# Patient Record
Sex: FEMALE | Race: WHITE | NOT HISPANIC OR LATINO
[De-identification: names, ages, dates, MRNs, and addresses within clinical notes are randomized per-mention and may not be internally consistent; named-entity substitution may affect disease eponyms.]

---
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
## 2018-08-20 ENCOUNTER — TRANSCRIPTION ENCOUNTER (OUTPATIENT)
Age: 18
End: 2018-08-20

## 2019-01-02 ENCOUNTER — OUTPATIENT (OUTPATIENT)
Dept: OUTPATIENT SERVICES | Facility: HOSPITAL | Age: 19
LOS: 1 days | Discharge: HOME | End: 2019-01-02

## 2019-01-02 DIAGNOSIS — E61.1 IRON DEFICIENCY: ICD-10-CM

## 2019-01-02 DIAGNOSIS — Z13.31 ENCOUNTER FOR SCREENING FOR DEPRESSION: ICD-10-CM

## 2019-01-02 DIAGNOSIS — R94.6 ABNORMAL RESULTS OF THYROID FUNCTION STUDIES: ICD-10-CM

## 2019-01-02 DIAGNOSIS — D51.9 VITAMIN B12 DEFICIENCY ANEMIA, UNSPECIFIED: ICD-10-CM

## 2019-01-02 DIAGNOSIS — R53.82 CHRONIC FATIGUE, UNSPECIFIED: ICD-10-CM

## 2019-01-02 DIAGNOSIS — E78.5 HYPERLIPIDEMIA, UNSPECIFIED: ICD-10-CM

## 2019-09-11 ENCOUNTER — EMERGENCY (EMERGENCY)
Facility: HOSPITAL | Age: 19
LOS: 0 days | Discharge: HOME | End: 2019-09-11
Admitting: FAMILY MEDICINE

## 2019-11-23 ENCOUNTER — OUTPATIENT (OUTPATIENT)
Dept: OUTPATIENT SERVICES | Facility: HOSPITAL | Age: 19
LOS: 1 days | Discharge: HOME | End: 2019-11-23
Payer: MEDICAID

## 2019-11-23 DIAGNOSIS — R31.9 HEMATURIA, UNSPECIFIED: ICD-10-CM

## 2019-11-23 PROCEDURE — 76770 US EXAM ABDO BACK WALL COMP: CPT | Mod: 26

## 2019-12-09 ENCOUNTER — TRANSCRIPTION ENCOUNTER (OUTPATIENT)
Age: 19
End: 2019-12-09

## 2019-12-27 ENCOUNTER — OUTPATIENT (OUTPATIENT)
Dept: OUTPATIENT SERVICES | Facility: HOSPITAL | Age: 19
LOS: 1 days | Discharge: HOME | End: 2019-12-27

## 2020-04-22 ENCOUNTER — TRANSCRIPTION ENCOUNTER (OUTPATIENT)
Age: 20
End: 2020-04-22

## 2021-06-04 ENCOUNTER — TRANSCRIPTION ENCOUNTER (OUTPATIENT)
Age: 21
End: 2021-06-04

## 2022-03-21 PROBLEM — Z00.00 ENCOUNTER FOR PREVENTIVE HEALTH EXAMINATION: Status: ACTIVE | Noted: 2022-03-21

## 2022-03-31 ENCOUNTER — TRANSCRIPTION ENCOUNTER (OUTPATIENT)
Age: 22
End: 2022-03-31

## 2022-04-04 ENCOUNTER — TRANSCRIPTION ENCOUNTER (OUTPATIENT)
Age: 22
End: 2022-04-04

## 2022-05-02 ENCOUNTER — APPOINTMENT (OUTPATIENT)
Dept: UROGYNECOLOGY | Facility: CLINIC | Age: 22
End: 2022-05-02

## 2022-07-15 ENCOUNTER — NON-APPOINTMENT (OUTPATIENT)
Age: 22
End: 2022-07-15

## 2022-09-06 ENCOUNTER — NON-APPOINTMENT (OUTPATIENT)
Age: 22
End: 2022-09-06

## 2022-11-12 ENCOUNTER — NON-APPOINTMENT (OUTPATIENT)
Age: 22
End: 2022-11-12

## 2022-11-14 ENCOUNTER — INPATIENT (INPATIENT)
Facility: HOSPITAL | Age: 22
LOS: 0 days | Discharge: HOME | End: 2022-11-15
Attending: HOSPITALIST | Admitting: HOSPITALIST

## 2022-11-14 VITALS
DIASTOLIC BLOOD PRESSURE: 66 MMHG | TEMPERATURE: 101 F | RESPIRATION RATE: 20 BRPM | WEIGHT: 169.98 LBS | OXYGEN SATURATION: 100 % | SYSTOLIC BLOOD PRESSURE: 120 MMHG | HEART RATE: 123 BPM

## 2022-11-14 DIAGNOSIS — Z98.891 HISTORY OF UTERINE SCAR FROM PREVIOUS SURGERY: Chronic | ICD-10-CM

## 2022-11-14 LAB
ALBUMIN SERPL ELPH-MCNC: 4.4 G/DL — SIGNIFICANT CHANGE UP (ref 3.5–5.2)
ALP SERPL-CCNC: 83 U/L — SIGNIFICANT CHANGE UP (ref 30–115)
ALT FLD-CCNC: 8 U/L — SIGNIFICANT CHANGE UP (ref 0–41)
ANION GAP SERPL CALC-SCNC: 12 MMOL/L — SIGNIFICANT CHANGE UP (ref 7–14)
APPEARANCE UR: ABNORMAL
AST SERPL-CCNC: 14 U/L — SIGNIFICANT CHANGE UP (ref 0–41)
BACTERIA # UR AUTO: ABNORMAL
BASOPHILS # BLD AUTO: 0.02 K/UL — SIGNIFICANT CHANGE UP (ref 0–0.2)
BASOPHILS NFR BLD AUTO: 0.1 % — SIGNIFICANT CHANGE UP (ref 0–1)
BILIRUB DIRECT SERPL-MCNC: <0.2 MG/DL — SIGNIFICANT CHANGE UP (ref 0–0.3)
BILIRUB INDIRECT FLD-MCNC: >0.2 MG/DL — SIGNIFICANT CHANGE UP (ref 0.2–1.2)
BILIRUB SERPL-MCNC: 0.4 MG/DL — SIGNIFICANT CHANGE UP (ref 0.2–1.2)
BILIRUB UR-MCNC: NEGATIVE — SIGNIFICANT CHANGE UP
BUN SERPL-MCNC: 6 MG/DL — LOW (ref 10–20)
CALCIUM SERPL-MCNC: 8.8 MG/DL — SIGNIFICANT CHANGE UP (ref 8.4–10.5)
CHLORIDE SERPL-SCNC: 98 MMOL/L — SIGNIFICANT CHANGE UP (ref 98–110)
CO2 SERPL-SCNC: 22 MMOL/L — SIGNIFICANT CHANGE UP (ref 17–32)
COLOR SPEC: YELLOW — SIGNIFICANT CHANGE UP
CREAT SERPL-MCNC: 0.8 MG/DL — SIGNIFICANT CHANGE UP (ref 0.7–1.5)
DIFF PNL FLD: ABNORMAL
EGFR: 105 ML/MIN/1.73M2 — SIGNIFICANT CHANGE UP
EOSINOPHIL # BLD AUTO: 0 K/UL — SIGNIFICANT CHANGE UP (ref 0–0.7)
EOSINOPHIL NFR BLD AUTO: 0 % — SIGNIFICANT CHANGE UP (ref 0–8)
EPI CELLS # UR: 3 /HPF — SIGNIFICANT CHANGE UP (ref 0–5)
GLUCOSE SERPL-MCNC: 103 MG/DL — HIGH (ref 70–99)
GLUCOSE UR QL: NEGATIVE — SIGNIFICANT CHANGE UP
HCT VFR BLD CALC: 39.4 % — SIGNIFICANT CHANGE UP (ref 37–47)
HGB BLD-MCNC: 13.3 G/DL — SIGNIFICANT CHANGE UP (ref 12–16)
HYALINE CASTS # UR AUTO: 2 /LPF — SIGNIFICANT CHANGE UP (ref 0–7)
IMM GRANULOCYTES NFR BLD AUTO: 0.4 % — HIGH (ref 0.1–0.3)
KETONES UR-MCNC: ABNORMAL
LACTATE SERPL-SCNC: 1 MMOL/L — SIGNIFICANT CHANGE UP (ref 0.7–2)
LEUKOCYTE ESTERASE UR-ACNC: ABNORMAL
LIDOCAIN IGE QN: 16 U/L — SIGNIFICANT CHANGE UP (ref 7–60)
LYMPHOCYTES # BLD AUTO: 1.07 K/UL — LOW (ref 1.2–3.4)
LYMPHOCYTES # BLD AUTO: 7.7 % — LOW (ref 20.5–51.1)
MCHC RBC-ENTMCNC: 29 PG — SIGNIFICANT CHANGE UP (ref 27–31)
MCHC RBC-ENTMCNC: 33.8 G/DL — SIGNIFICANT CHANGE UP (ref 32–37)
MCV RBC AUTO: 85.8 FL — SIGNIFICANT CHANGE UP (ref 81–99)
MONOCYTES # BLD AUTO: 1.41 K/UL — HIGH (ref 0.1–0.6)
MONOCYTES NFR BLD AUTO: 10.1 % — HIGH (ref 1.7–9.3)
NEUTROPHILS # BLD AUTO: 11.37 K/UL — HIGH (ref 1.4–6.5)
NEUTROPHILS NFR BLD AUTO: 81.7 % — HIGH (ref 42.2–75.2)
NITRITE UR-MCNC: NEGATIVE — SIGNIFICANT CHANGE UP
NRBC # BLD: 0 /100 WBCS — SIGNIFICANT CHANGE UP (ref 0–0)
PH UR: 6 — SIGNIFICANT CHANGE UP (ref 5–8)
PLATELET # BLD AUTO: 270 K/UL — SIGNIFICANT CHANGE UP (ref 130–400)
POTASSIUM SERPL-MCNC: 4.5 MMOL/L — SIGNIFICANT CHANGE UP (ref 3.5–5)
POTASSIUM SERPL-SCNC: 4.5 MMOL/L — SIGNIFICANT CHANGE UP (ref 3.5–5)
PROT SERPL-MCNC: 7 G/DL — SIGNIFICANT CHANGE UP (ref 6–8)
PROT UR-MCNC: ABNORMAL
RBC # BLD: 4.59 M/UL — SIGNIFICANT CHANGE UP (ref 4.2–5.4)
RBC # FLD: 13 % — SIGNIFICANT CHANGE UP (ref 11.5–14.5)
RBC CASTS # UR COMP ASSIST: 6 /HPF — HIGH (ref 0–4)
SARS-COV-2 RNA SPEC QL NAA+PROBE: SIGNIFICANT CHANGE UP
SODIUM SERPL-SCNC: 132 MMOL/L — LOW (ref 135–146)
SP GR SPEC: 1.01 — SIGNIFICANT CHANGE UP (ref 1.01–1.03)
UROBILINOGEN FLD QL: SIGNIFICANT CHANGE UP
WBC # BLD: 13.93 K/UL — HIGH (ref 4.8–10.8)
WBC # FLD AUTO: 13.93 K/UL — HIGH (ref 4.8–10.8)
WBC UR QL: 648 /HPF — HIGH (ref 0–5)

## 2022-11-14 PROCEDURE — 99223 1ST HOSP IP/OBS HIGH 75: CPT

## 2022-11-14 PROCEDURE — 99285 EMERGENCY DEPT VISIT HI MDM: CPT

## 2022-11-14 PROCEDURE — 74177 CT ABD & PELVIS W/CONTRAST: CPT | Mod: 26,MA

## 2022-11-14 RX ORDER — SENNA PLUS 8.6 MG/1
2 TABLET ORAL AT BEDTIME
Refills: 0 | Status: DISCONTINUED | OUTPATIENT
Start: 2022-11-14 | End: 2022-11-15

## 2022-11-14 RX ORDER — DIATRIZOATE MEGLUMINE 180 MG/ML
30 INJECTION, SOLUTION INTRAVESICAL ONCE
Refills: 0 | Status: COMPLETED | OUTPATIENT
Start: 2022-11-14 | End: 2022-11-14

## 2022-11-14 RX ORDER — ACETAMINOPHEN 500 MG
975 TABLET ORAL ONCE
Refills: 0 | Status: COMPLETED | OUTPATIENT
Start: 2022-11-14 | End: 2022-11-14

## 2022-11-14 RX ORDER — FAMOTIDINE 10 MG/ML
20 INJECTION INTRAVENOUS ONCE
Refills: 0 | Status: COMPLETED | OUTPATIENT
Start: 2022-11-14 | End: 2022-11-14

## 2022-11-14 RX ORDER — SODIUM CHLORIDE 9 MG/ML
1000 INJECTION INTRAMUSCULAR; INTRAVENOUS; SUBCUTANEOUS ONCE
Refills: 0 | Status: COMPLETED | OUTPATIENT
Start: 2022-11-14 | End: 2022-11-14

## 2022-11-14 RX ORDER — ONDANSETRON 8 MG/1
4 TABLET, FILM COATED ORAL ONCE
Refills: 0 | Status: COMPLETED | OUTPATIENT
Start: 2022-11-14 | End: 2022-11-14

## 2022-11-14 RX ORDER — CEFTRIAXONE 500 MG/1
1000 INJECTION, POWDER, FOR SOLUTION INTRAMUSCULAR; INTRAVENOUS ONCE
Refills: 0 | Status: COMPLETED | OUTPATIENT
Start: 2022-11-14 | End: 2022-11-14

## 2022-11-14 RX ORDER — POLYETHYLENE GLYCOL 3350 17 G/17G
17 POWDER, FOR SOLUTION ORAL DAILY
Refills: 0 | Status: DISCONTINUED | OUTPATIENT
Start: 2022-11-14 | End: 2022-11-15

## 2022-11-14 RX ADMIN — SODIUM CHLORIDE 1000 MILLILITER(S): 9 INJECTION INTRAMUSCULAR; INTRAVENOUS; SUBCUTANEOUS at 14:47

## 2022-11-14 RX ADMIN — ONDANSETRON 4 MILLIGRAM(S): 8 TABLET, FILM COATED ORAL at 14:47

## 2022-11-14 RX ADMIN — FAMOTIDINE 20 MILLIGRAM(S): 10 INJECTION INTRAVENOUS at 14:47

## 2022-11-14 RX ADMIN — DIATRIZOATE MEGLUMINE 30 MILLILITER(S): 180 INJECTION, SOLUTION INTRAVESICAL at 15:16

## 2022-11-14 RX ADMIN — Medication 975 MILLIGRAM(S): at 14:47

## 2022-11-14 RX ADMIN — CEFTRIAXONE 100 MILLIGRAM(S): 500 INJECTION, POWDER, FOR SOLUTION INTRAMUSCULAR; INTRAVENOUS at 18:00

## 2022-11-14 NOTE — ED PROVIDER NOTE - ATTENDING APP SHARED VISIT CONTRIBUTION OF CARE
22-year-old female presents to the ED for left lower quadrant abdominal pain.  Also reports fevers chills and dysuria.  Physical exam the left flank as well as left lower quadrant abdominal pain.  Labs reviewed by me noted to have leukocytosis along with a UA that had large leuk esterase.      Given ceftriaxone. Case was signed out to  pending CT abdomen pelvis.

## 2022-11-14 NOTE — ED PROVIDER NOTE - CLINICAL SUMMARY MEDICAL DECISION MAKING FREE TEXT BOX
pt nontoxic appearing during ED period. ed w/u showing ascending UTI, CT c/f developing phlegmon.  iv abx given. will admit for monitoring, parenteral abx, further w/u

## 2022-11-14 NOTE — H&P ADULT - ASSESSMENT
A 22-year-old woman with a pmhx of recurrent UTIs, hypothyroidism, and anxiety came to the ED complaining of dull abdominal pain and constipation for 4 days. Associated with fevers, chills, urinary frequency and suprapubic pain. The patient visited an urgent care a day before presentation and was prescribed a course of antibiotics but did not relief her symptoms so she presented to the ED. She has a hx of recurrent UTIs, latest in June 2021, and one episode of pyelonephritis in 2019 that required hospitalization. She was admitted for workup and treatment of pyelonephritis.       #Pyelonephritis  #Developing renal abscess  #Recurrent UTIs  - Patient has a hx of recurrent UTIs including pyelonephritis in 2019   - Leukocytosis. UA showed +LE, +Leukocytes, + bacteria. FU urine culture and blood culture    - CTAP w oral contrast showed; left renal striated nephrogram (consistent with pyelonephritis), 1.4 left upper pole hypodensity which may represent developing abscess.   - s/p one dose of rocephin  - Rx: Loading dose vancomycin 20 mg/kg IV then continue with 15 mg IV q8h (MRSA coverage)+ meropenem 1 g IV q8h (Gram - bacilli coverage)  - FU ID consult     #Hypothyroidism   - FU TSH   - cw levothyroxine     #Anxiety  - cw bupropion     # constipation  - Senna and miralax     #Misc;  - GI prophylaxis: none  - DVT prophylaxis:  - Diet; regular   - Dispo: medicine floor  A 22-year-old woman with a pmhx of recurrent UTIs, hypothyroidism, and anxiety came to the ED complaining of dull abdominal pain and constipation for 4 days. Associated with fevers, chills, urinary frequency and suprapubic pain. The patient visited an urgent care a day before presentation and was prescribed a course of antibiotics but did not relief her symptoms so she presented to the ED. She has a hx of recurrent UTIs, latest in June 2021, and one episode of pyelonephritis in 2019 that required hospitalization. She was admitted for workup and treatment of pyelonephritis.       #Pyelonephritis  #Developing renal abscess  #Recurrent UTIs  - Patient has a hx of recurrent UTIs including pyelonephritis in 2019   - Leukocytosis. UA showed +LE, +Leukocytes, + bacteria. FU urine culture and blood culture    - CTAP w oral and IV contrast showed; left renal striated nephrogram (consistent with pyelonephritis), 1.4 left upper pole hypodensity which may represent developing abscess.   - s/p one dose of rocephin in the ED. Patient also took antibiotics from urgent care yesterday  - Rx: Loading dose vancomycin 20 mg/kg IV then continue with 15 mg IV q8h (MRSA coverage)+ meropenem 1 g IV q8h (Gram - bacilli coverage)  - FU ID consult     #Hypothyroidism   - FU TSH   - cw levothyroxine     #Anxiety  - cw bupropion     # constipation  - Senna and miralax     #Misc;  - GI prophylaxis: none  - DVT prophylaxis:  - Diet; regular   - Dispo: medicine floor  A 22-year-old woman with a pmhx of recurrent UTIs, hypothyroidism, and anxiety came to the ED complaining of dull abdominal pain and constipation for 4 days. Associated with fevers, chills, urinary frequency and suprapubic pain. The patient visited an urgent care a day before presentation and was prescribed a course of antibiotics but did not relief her symptoms so she presented to the ED. She has a hx of recurrent UTIs, latest in June 2021, and one episode of pyelonephritis in 2019 that required hospitalization. She was admitted for workup and treatment of pyelonephritis.       #Pyelonephritis  #Developing renal abscess  #Recurrent UTIs  - Patient has a hx of recurrent UTIs including pyelonephritis in 2019   - Leukocytosis. UA showed +LE, +Leukocytes, + bacteria. FU urine culture and blood culture    - CTAP w oral and IV contrast showed; left renal striated nephrogram (consistent with pyelonephritis), 1.4 left upper pole hypodensity which may represent developing abscess.   - s/p one dose of rocephin in the ED. Patient also took antibiotics from urgent care yesterday  - Rx: Loading dose vancomycin 20 mg/kg IV then continue with 15 mg IV q8h (MRSA coverage)+ meropenem 1 g IV q12h (Gram - bacilli coverage)  - FU ID consult     #Hypothyroidism   - FU TSH   - cw levothyroxine     #Anxiety  - cw bupropion     # constipation  - Senna and miralax     #Misc;  - GI prophylaxis: none  - DVT prophylaxis:  - Diet; regular   - Dispo: medicine floor  A 22-year-old woman with a pmhx of recurrent UTIs, hypothyroidism, and anxiety came to the ED complaining of dull abdominal pain and constipation for 4 days. Associated with fevers, chills, urinary frequency and suprapubic pain. The patient visited an urgent care a day before presentation and was prescribed a course of antibiotics but did not relief her symptoms so she presented to the ED. She has a hx of recurrent UTIs, latest in June 2021, and one episode of pyelonephritis in 2019 that required hospitalization. She was admitted for workup and treatment of pyelonephritis.       #Pyelonephritis  #Developing renal abscess  #Recurrent UTIs  - Patient has a hx of recurrent UTIs including pyelonephritis in 2019   - Leukocytosis. UA showed +LE, +Leukocytes, + bacteria. FU urine culture and blood culture    - CTAP w oral and IV contrast showed; left renal striated nephrogram (consistent with pyelonephritis), 1.4 left upper pole hypodensity which may represent developing abscess.   - s/p one dose of rocephin in the ED. Patient also took antibiotics from urgent care yesterday  - Rx: Loading dose vancomycin 20 mg/kg IV then continue with 15 mg IV q12h (MRSA coverage)+ meropenem 1 g IV q8h (Gram - bacilli coverage)  - FU ID consult     #Hypothyroidism   - FU TSH   - cw levothyroxine     #Anxiety  - cw bupropion     # constipation  - Senna and miralax     #Misc;  - GI prophylaxis: none  - DVT prophylaxis:  - Diet; regular   - Dispo: medicine floor

## 2022-11-14 NOTE — H&P ADULT - ATTENDING COMMENTS
Patient seen and examined at bedside independently of the residents. I read the resident's note and agree with the plan with the additions and corrections as noted below.    REVIEW OF SYSTEMS:  CONSTITUTIONAL: No weakness. Fever/Chills.   EYES/ENT: No visual changes;  No vertigo or throat pain   NECK: No pain or stiffness  RESPIRATORY: No cough, wheezing, hemoptysis; No shortness of breath  CARDIOVASCULAR: No chest pain or palpitations  GASTROINTESTINAL: No abdominal or epigastric pain. No nausea, vomiting, or hematemesis; No diarrhea or constipation. No melena or hematochezia.  GENITOURINARY: No  frequency or hematuria. Dysuria.   NEUROLOGICAL: No numbness or weakness  MSK: No pain. No weakness.   SKIN: No itching, rashes.     PMH: Recurrent UTI, Hypothyroidism and Anxiety     FHx: Reviewed. Not relevant.     Physical Exam:  GEN: No acute distress. A & O x 3.   Head: Atraumatic, Normocephalic.   Eye: PEERLA. No sclera icterus. EOMI.   ENT: Normal oropharynx, no thyromegaly.   LUNGS: Clear to auscultation bilaterally. No wheeze/rales/crackles.   HEART: Normal. S1/S2 present. RRR. No murmur/gallops.   ABD: Soft, non-tender, non-distended. Bowel sounds present.   EXT: No pitting edema.   Integumentary: No rash, No petechia.   NEURO: CN III-XII intact. Strength: 5/5 b/l ULE. Sensory intact b/l ULE.     Vital Signs Last 24 Hrs  T(C): 37.6 (2022 22:00), Max: 38.2 (2022 13:38)  T(F): 99.6 (2022 22:00), Max: 100.8 (2022 13:38)  HR: 96 (2022 22:00) (96 - 123)  BP: 115/70 (2022 22:00) (102/65 - 120/66)  BP(mean): --  RR: 16 (2022 22:00) (16 - 20)  SpO2: 97% (2022 22:00) (97% - 100%)    Parameters below as of :00  Patient On (Oxygen Delivery Method): room air      Please see the above notes for Labs and radiology.     Assessment and Plan:     23 yo F with hx of Recurrent UTI, Hypothyroidism and Anxiety presents to ED for 4 days history of fever, chills, dysuria and lower abdominal pain.     Sepsis likely 2/2 pyelonephritis with abscess   CT abdomen shows Left renal striated nephrogram consistent with pyelonephritis. 1.4 cm left upper pole hypodensity may represent a developing abscess.  - will start on Vancomycin and meropenem   - check BCx and UCx  - ID consult     Hypothyroidism - check TSH. c/w home med  Anxiety - c/w home med  Constipation - senna and miralax     DVT ppx: Lovenox SC  GI ppx: not indicated.   Diet: regular diet  Activity: as tolerated.     Date seen by the attendin2022. Patient seen and examined at bedside independently of the residents. I read the resident's note and agree with the plan with the additions and corrections as noted below.    REVIEW OF SYSTEMS:  CONSTITUTIONAL: No weakness. Fever/Chills.   EYES/ENT: No visual changes;  No vertigo or throat pain   NECK: No pain or stiffness  RESPIRATORY: No cough, wheezing, hemoptysis; No shortness of breath  CARDIOVASCULAR: No chest pain or palpitations  GASTROINTESTINAL: No nausea, vomiting, or hematemesis; No diarrhea or constipation. No melena or hematochezia. Abdominal pain.   GENITOURINARY: No  frequency or hematuria. Dysuria.   NEUROLOGICAL: No numbness or weakness  MSK: No pain. No weakness.   SKIN: No itching, rashes.     PMH: Recurrent UTI, Hypothyroidism and Anxiety     FHx: Reviewed. Not relevant.     Physical Exam:  GEN: No acute distress. A & O x 3.   Head: Atraumatic, Normocephalic.   Eye: PEERLA. No sclera icterus. EOMI.   ENT: Normal oropharynx, no thyromegaly.   LUNGS: Clear to auscultation bilaterally. No wheeze/rales/crackles.   HEART: Normal. S1/S2 present. RRR. No murmur/gallops.   ABD: Soft, non-tender, non-distended. Bowel sounds present.   EXT: No pitting edema.   Integumentary: No rash, No petechia.   NEURO: CN III-XII intact. Strength: 5/5 b/l ULE. Sensory intact b/l ULE.     Vital Signs Last 24 Hrs  T(C): 37.6 (2022 22:00), Max: 38.2 (2022 13:38)  T(F): 99.6 (2022 22:00), Max: 100.8 (2022 13:38)  HR: 96 (2022 22:00) (96 - 123)  BP: 115/70 (2022 22:00) (102/65 - 120/66)  BP(mean): --  RR: 16 (2022 22:00) (16 - 20)  SpO2: 97% (2022 22:00) (97% - 100%)    Parameters below as of 2022 22:00  Patient On (Oxygen Delivery Method): room air      Please see the above notes for Labs and radiology.     Assessment and Plan:     23 yo F with hx of Recurrent UTI, Hypothyroidism and Anxiety presents to ED for 4 days history of fever, chills, dysuria and lower abdominal pain.     Sepsis likely 2/2 pyelonephritis with abscess   - CT abdomen shows Left renal striated nephrogram consistent with pyelonephritis. 1.4 cm left upper pole hypodensity may represent a developing abscess.  - will start on Vancomycin and meropenem (pt had recurrent UTI in the past treated with multiple abx prescribed by urgent care)  - check BCx and UCx  - ID consult     Hypothyroidism - check TSH. c/w home med  Anxiety - c/w home med  Constipation - senna and miralax     DVT ppx: Lovenox SC  GI ppx: not indicated.   Diet: regular diet  Activity: as tolerated.     Date seen by the attendin2022.

## 2022-11-14 NOTE — ED ADULT TRIAGE NOTE - CHIEF COMPLAINT QUOTE
patient reports constipation x 5 days. patient took enema and stool softener without relief. abdomen soft tender to right side. patient also noted to have fever in triage

## 2022-11-14 NOTE — ED PROVIDER NOTE - PHYSICAL EXAMINATION
Vital Signs: I have reviewed the initial vital signs.  Constitutional: NAD, well-nourished, appears stated age, no acute distress.  HEENT: Airway patent, moist MM, no erythema/swelling/deformity of oral structures. EOMI, PERRLA.  CV: regular rate, regular rhythm, well-perfused extremities, 2+ b/l DP and radial pulses equal.  Lungs: BCTA, no increased WOB.  ABD: +tenderness lower abdomen, ND, no guarding or rebound, no pulsatile mass, no hernias.   MSK: Neck supple, nontender, nl ROM, no stepoff. Chest nontender. Back nontender. No CVA tenderness. Ext nontender, nl rom, no deformity.   INTEG: Skin warm, dry, no rash.  NEURO: A&Ox3, normal strength, nl sensation throughout, normal speech.   PSYCH: Calm, cooperative, normal affect and interaction.

## 2022-11-14 NOTE — H&P ADULT - HISTORY OF PRESENT ILLNESS
A 22-year-old female with pmhx of recurrent UTIs, hypothyroidism, anxiety, presented to the ED today ( 11/14/22) complaining of abdominal pain and constipation for the past 4 days. According to the patient, the pain is dull, located in the left side of the abdomen, moderate in intensity, continuous without any radiation, no exacerbating or relieving factors. Additionally, she has been having fevers (not measured at home), chills, and a mild suprapubic pain. She also had urinary frequency. Denies any changes in urine color, no dysuria or burning sensation on urination. The patient went to the urgent care yesterday, and was prescribed a one week course of antibiotics but did not feel that it helped so she came to the ED.     She has a history of recurrent UTIs, latest one was in June 2021 which she took antibiotics for. According to the patient, she approximately had around 8 UTIs in the past 3 years. Additionally, she had a kidney infection when she was in [place] (she used to live there) in 2019 that required hospitalization and IV antibiotics.

## 2022-11-14 NOTE — ED PROVIDER NOTE - NS ED ROS FT
Constitutional: (+) fever, (+)chills  Eyes/ENT: (-) blurry vision, (-) epistaxis  Cardiovascular: (-) chest pain, (-) syncope  Respiratory: (-) cough, (-) shortness of breath  Gastrointestinal: (-) vomiting, (-) diarrhea, (+)constipation, (+)abd pain  : (+)dysuria  Musculoskeletal: (-) neck pain, (-) back pain, (-) joint pain  Integumentary: (-) rash, (-) edema  Neurological: (-) headache, (-) altered mental status  Psychiatric: (-) hallucinations

## 2022-11-14 NOTE — ED PROVIDER NOTE - CRITERIA ADMIT PEDS PT
10/19/2020         RE: Shena Hartmann  [address] [place] [address]        Dear Colleague,    Thank you for referring your patient, Shena Hartmann, to the [facility] [place]. Please see a copy of my visit note below.    Infusion Nursing Note:  Shena Hartmann presents today for scheduled Cycle 2, Day 22 Daratumumab Fastpro.    Patient seen by provider today: No   present during visit today: Not Applicable.    Note: Labs were monitored and lab parameters for today's treatment were met.  Patient assessment was completed and unremarkable except:  Patient has a history of tingling in bilateral feet and hands and mild tingling in her tongue - which she states is at her baseline.  Patient has chronic generalized body aches, which is at her baseline this morning.      Treatment Conditions:  Patient received 650 mg oral Tylenol and 25 mg oral Benadryl prior to receiving scheduled Daratumumab Fastpro injection in her lower right abdomen.      Note:  Patient does not take Decadron pre-med and Dr. Hills has approved this.      Post Infusion Assessment:  Patient tolerated injection without incident.       Discharge Plan:   Patient was ambulatory and discharged in the care of her .    TAMMY REYES, MELISSA                            Again, thank you for allowing me to participate in the care of your patient.        Sincerely,        [facility]     No

## 2022-11-14 NOTE — H&P ADULT - NSHPLABSRESULTS_GEN_ALL_CORE
.  LABS:                         13.3   13.93 )-----------( 270      ( 2022 14:43 )             39.4         132<L>  |  98  |  6<L>  ----------------------------<  103<H>  4.5   |  22  |  0.8    Ca    8.8      2022 14:43    TPro  7.0  /  Alb  4.4  /  TBili  0.4  /  DBili  <0.2  /  AST  14  /  ALT  8   /  AlkPhos  83        Urinalysis Basic - ( 2022 14:43 )    Color: Yellow / Appearance: Slightly Turbid / S.014 / pH: x  Gluc: x / Ketone: Moderate  / Bili: Negative / Urobili: <2 mg/dL   Blood: x / Protein: 30 mg/dL / Nitrite: Negative   Leuk Esterase: Large / RBC: 6 /HPF /  /HPF   Sq Epi: x / Non Sq Epi: 3 /HPF / Bacteria: Moderate        Lactate, Blood: 1.0 mmol/L ( @ 14:43)      RADIOLOGY, EKG & ADDITIONAL TESTS: Reviewed.

## 2022-11-14 NOTE — H&P ADULT - NSHPPHYSICALEXAM_GEN_ALL_CORE
VITALS:   T(C): 37.6 (11-14-22 @ 22:00), Max: 38.2 (11-14-22 @ 13:38)  HR: 96 (11-14-22 @ 22:00) (96 - 123)  BP: 115/70 (11-14-22 @ 22:00) (102/65 - 120/66)  RR: 16 (11-14-22 @ 22:00) (16 - 20)  SpO2: 97% (11-14-22 @ 22:00) (97% - 100%)    GENERAL: NAD, sitting in bed comfortably   HEAD:  Atraumatic, normocephalic  EYES: EOMI, PERRLA, conjunctiva and sclera clear  ENT: Moist mucous membranes  NECK: Supple, no JVD  HEART: Regular rate and rhythm, no murmurs, rubs, or gallops  LUNGS: Unlabored respirations.  Clear to auscultation bilaterally, no crackles, wheezing, or rhonchi  ABDOMEN: Soft,  nondistended, +BS. Left costovertebral angle tenderness     EXTREMITIES: 2+ peripheral pulses bilaterally. No clubbing, cyanosis, or edema  NERVOUS SYSTEM:  A&Ox3, no focal deficits   SKIN: No rashes or lesions

## 2022-11-15 ENCOUNTER — TRANSCRIPTION ENCOUNTER (OUTPATIENT)
Age: 22
End: 2022-11-15

## 2022-11-15 ENCOUNTER — INPATIENT (INPATIENT)
Facility: HOSPITAL | Age: 22
LOS: 1 days | Discharge: HOME HEALTH | End: 2022-11-17
Attending: STUDENT IN AN ORGANIZED HEALTH CARE EDUCATION/TRAINING PROGRAM | Admitting: STUDENT IN AN ORGANIZED HEALTH CARE EDUCATION/TRAINING PROGRAM

## 2022-11-15 VITALS
RESPIRATION RATE: 18 BRPM | WEIGHT: 169.98 LBS | OXYGEN SATURATION: 96 % | TEMPERATURE: 97 F | DIASTOLIC BLOOD PRESSURE: 69 MMHG | SYSTOLIC BLOOD PRESSURE: 118 MMHG | HEIGHT: 65 IN | HEART RATE: 103 BPM

## 2022-11-15 VITALS
HEART RATE: 87 BPM | DIASTOLIC BLOOD PRESSURE: 58 MMHG | SYSTOLIC BLOOD PRESSURE: 107 MMHG | TEMPERATURE: 99 F | RESPIRATION RATE: 18 BRPM

## 2022-11-15 DIAGNOSIS — Z98.891 HISTORY OF UTERINE SCAR FROM PREVIOUS SURGERY: Chronic | ICD-10-CM

## 2022-11-15 LAB
A1C WITH ESTIMATED AVERAGE GLUCOSE RESULT: 5.3 % — SIGNIFICANT CHANGE UP (ref 4–5.6)
ALBUMIN SERPL ELPH-MCNC: 3.8 G/DL — SIGNIFICANT CHANGE UP (ref 3.5–5.2)
ALBUMIN SERPL ELPH-MCNC: 4 G/DL — SIGNIFICANT CHANGE UP (ref 3.5–5.2)
ALP SERPL-CCNC: 81 U/L — SIGNIFICANT CHANGE UP (ref 30–115)
ALP SERPL-CCNC: 81 U/L — SIGNIFICANT CHANGE UP (ref 30–115)
ALT FLD-CCNC: 10 U/L — SIGNIFICANT CHANGE UP (ref 0–41)
ALT FLD-CCNC: 17 U/L — SIGNIFICANT CHANGE UP (ref 0–41)
ANION GAP SERPL CALC-SCNC: 10 MMOL/L — SIGNIFICANT CHANGE UP (ref 7–14)
ANION GAP SERPL CALC-SCNC: 13 MMOL/L — SIGNIFICANT CHANGE UP (ref 7–14)
APTT BLD: 28.9 SEC — SIGNIFICANT CHANGE UP (ref 27–39.2)
AST SERPL-CCNC: 14 U/L — SIGNIFICANT CHANGE UP (ref 0–41)
AST SERPL-CCNC: 23 U/L — SIGNIFICANT CHANGE UP (ref 0–41)
BASOPHILS # BLD AUTO: 0.03 K/UL — SIGNIFICANT CHANGE UP (ref 0–0.2)
BASOPHILS # BLD AUTO: 0.04 K/UL — SIGNIFICANT CHANGE UP (ref 0–0.2)
BASOPHILS NFR BLD AUTO: 0.3 % — SIGNIFICANT CHANGE UP (ref 0–1)
BASOPHILS NFR BLD AUTO: 0.3 % — SIGNIFICANT CHANGE UP (ref 0–1)
BILIRUB SERPL-MCNC: 0.2 MG/DL — SIGNIFICANT CHANGE UP (ref 0.2–1.2)
BILIRUB SERPL-MCNC: <0.2 MG/DL — SIGNIFICANT CHANGE UP (ref 0.2–1.2)
BUN SERPL-MCNC: 6 MG/DL — LOW (ref 10–20)
BUN SERPL-MCNC: 8 MG/DL — LOW (ref 10–20)
CALCIUM SERPL-MCNC: 8.7 MG/DL — SIGNIFICANT CHANGE UP (ref 8.4–10.4)
CALCIUM SERPL-MCNC: 8.7 MG/DL — SIGNIFICANT CHANGE UP (ref 8.4–10.5)
CHLORIDE SERPL-SCNC: 102 MMOL/L — SIGNIFICANT CHANGE UP (ref 98–110)
CHLORIDE SERPL-SCNC: 102 MMOL/L — SIGNIFICANT CHANGE UP (ref 98–110)
CO2 SERPL-SCNC: 24 MMOL/L — SIGNIFICANT CHANGE UP (ref 17–32)
CO2 SERPL-SCNC: 25 MMOL/L — SIGNIFICANT CHANGE UP (ref 17–32)
CREAT SERPL-MCNC: 0.7 MG/DL — SIGNIFICANT CHANGE UP (ref 0.7–1.5)
CREAT SERPL-MCNC: 0.7 MG/DL — SIGNIFICANT CHANGE UP (ref 0.7–1.5)
EGFR: 124 ML/MIN/1.73M2 — SIGNIFICANT CHANGE UP
EGFR: 124 ML/MIN/1.73M2 — SIGNIFICANT CHANGE UP
EOSINOPHIL # BLD AUTO: 0.01 K/UL — SIGNIFICANT CHANGE UP (ref 0–0.7)
EOSINOPHIL # BLD AUTO: 0.02 K/UL — SIGNIFICANT CHANGE UP (ref 0–0.7)
EOSINOPHIL NFR BLD AUTO: 0.1 % — SIGNIFICANT CHANGE UP (ref 0–8)
EOSINOPHIL NFR BLD AUTO: 0.2 % — SIGNIFICANT CHANGE UP (ref 0–8)
ESTIMATED AVERAGE GLUCOSE: 105 MG/DL — SIGNIFICANT CHANGE UP (ref 68–114)
GLUCOSE SERPL-MCNC: 111 MG/DL — HIGH (ref 70–99)
GLUCOSE SERPL-MCNC: 129 MG/DL — HIGH (ref 70–99)
HCT VFR BLD CALC: 36.8 % — LOW (ref 37–47)
HCT VFR BLD CALC: 37.4 % — SIGNIFICANT CHANGE UP (ref 37–47)
HGB BLD-MCNC: 12.3 G/DL — SIGNIFICANT CHANGE UP (ref 12–16)
HGB BLD-MCNC: 12.5 G/DL — SIGNIFICANT CHANGE UP (ref 12–16)
IMM GRANULOCYTES NFR BLD AUTO: 0.4 % — HIGH (ref 0.1–0.3)
IMM GRANULOCYTES NFR BLD AUTO: 0.4 % — HIGH (ref 0.1–0.3)
INR BLD: 1.06 RATIO — SIGNIFICANT CHANGE UP (ref 0.65–1.3)
LACTATE SERPL-SCNC: 1.4 MMOL/L — SIGNIFICANT CHANGE UP (ref 0.7–2)
LYMPHOCYTES # BLD AUTO: 1.53 K/UL — SIGNIFICANT CHANGE UP (ref 1.2–3.4)
LYMPHOCYTES # BLD AUTO: 1.54 K/UL — SIGNIFICANT CHANGE UP (ref 1.2–3.4)
LYMPHOCYTES # BLD AUTO: 13.1 % — LOW (ref 20.5–51.1)
LYMPHOCYTES # BLD AUTO: 13.2 % — LOW (ref 20.5–51.1)
MCHC RBC-ENTMCNC: 28.9 PG — SIGNIFICANT CHANGE UP (ref 27–31)
MCHC RBC-ENTMCNC: 29 PG — SIGNIFICANT CHANGE UP (ref 27–31)
MCHC RBC-ENTMCNC: 33.4 G/DL — SIGNIFICANT CHANGE UP (ref 32–37)
MCHC RBC-ENTMCNC: 33.4 G/DL — SIGNIFICANT CHANGE UP (ref 32–37)
MCV RBC AUTO: 86.4 FL — SIGNIFICANT CHANGE UP (ref 81–99)
MCV RBC AUTO: 86.8 FL — SIGNIFICANT CHANGE UP (ref 81–99)
MONOCYTES # BLD AUTO: 1.1 K/UL — HIGH (ref 0.1–0.6)
MONOCYTES # BLD AUTO: 1.28 K/UL — HIGH (ref 0.1–0.6)
MONOCYTES NFR BLD AUTO: 11 % — HIGH (ref 1.7–9.3)
MONOCYTES NFR BLD AUTO: 9.4 % — HIGH (ref 1.7–9.3)
NEUTROPHILS # BLD AUTO: 8.75 K/UL — HIGH (ref 1.4–6.5)
NEUTROPHILS # BLD AUTO: 8.97 K/UL — HIGH (ref 1.4–6.5)
NEUTROPHILS NFR BLD AUTO: 75 % — SIGNIFICANT CHANGE UP (ref 42.2–75.2)
NEUTROPHILS NFR BLD AUTO: 76.6 % — HIGH (ref 42.2–75.2)
NRBC # BLD: 0 /100 WBCS — SIGNIFICANT CHANGE UP (ref 0–0)
NRBC # BLD: 0 /100 WBCS — SIGNIFICANT CHANGE UP (ref 0–0)
PLATELET # BLD AUTO: 267 K/UL — SIGNIFICANT CHANGE UP (ref 130–400)
PLATELET # BLD AUTO: 281 K/UL — SIGNIFICANT CHANGE UP (ref 130–400)
POTASSIUM SERPL-MCNC: 3.7 MMOL/L — SIGNIFICANT CHANGE UP (ref 3.5–5)
POTASSIUM SERPL-MCNC: 4.2 MMOL/L — SIGNIFICANT CHANGE UP (ref 3.5–5)
POTASSIUM SERPL-SCNC: 3.7 MMOL/L — SIGNIFICANT CHANGE UP (ref 3.5–5)
POTASSIUM SERPL-SCNC: 4.2 MMOL/L — SIGNIFICANT CHANGE UP (ref 3.5–5)
PROT SERPL-MCNC: 6.5 G/DL — SIGNIFICANT CHANGE UP (ref 6–8)
PROT SERPL-MCNC: 6.6 G/DL — SIGNIFICANT CHANGE UP (ref 6–8)
PROTHROM AB SERPL-ACNC: 12.1 SEC — SIGNIFICANT CHANGE UP (ref 9.95–12.87)
RBC # BLD: 4.24 M/UL — SIGNIFICANT CHANGE UP (ref 4.2–5.4)
RBC # BLD: 4.33 M/UL — SIGNIFICANT CHANGE UP (ref 4.2–5.4)
RBC # FLD: 13 % — SIGNIFICANT CHANGE UP (ref 11.5–14.5)
RBC # FLD: 13.2 % — SIGNIFICANT CHANGE UP (ref 11.5–14.5)
SODIUM SERPL-SCNC: 137 MMOL/L — SIGNIFICANT CHANGE UP (ref 135–146)
SODIUM SERPL-SCNC: 139 MMOL/L — SIGNIFICANT CHANGE UP (ref 135–146)
TROPONIN T SERPL-MCNC: <0.01 NG/ML — SIGNIFICANT CHANGE UP
TSH SERPL-MCNC: 1.29 UIU/ML — SIGNIFICANT CHANGE UP (ref 0.27–4.2)
WBC # BLD: 11.66 K/UL — HIGH (ref 4.8–10.8)
WBC # BLD: 11.71 K/UL — HIGH (ref 4.8–10.8)
WBC # FLD AUTO: 11.66 K/UL — HIGH (ref 4.8–10.8)
WBC # FLD AUTO: 11.71 K/UL — HIGH (ref 4.8–10.8)

## 2022-11-15 PROCEDURE — 93010 ELECTROCARDIOGRAM REPORT: CPT

## 2022-11-15 PROCEDURE — 99239 HOSP IP/OBS DSCHRG MGMT >30: CPT

## 2022-11-15 PROCEDURE — 99285 EMERGENCY DEPT VISIT HI MDM: CPT

## 2022-11-15 PROCEDURE — 99223 1ST HOSP IP/OBS HIGH 75: CPT | Mod: 25

## 2022-11-15 RX ORDER — ENOXAPARIN SODIUM 100 MG/ML
40 INJECTION SUBCUTANEOUS EVERY 24 HOURS
Refills: 0 | Status: DISCONTINUED | OUTPATIENT
Start: 2022-11-15 | End: 2022-11-15

## 2022-11-15 RX ORDER — INFLUENZA VIRUS VACCINE 15; 15; 15; 15 UG/.5ML; UG/.5ML; UG/.5ML; UG/.5ML
0.5 SUSPENSION INTRAMUSCULAR ONCE
Refills: 0 | Status: DISCONTINUED | OUTPATIENT
Start: 2022-11-15 | End: 2022-11-15

## 2022-11-15 RX ORDER — LEVOTHYROXINE SODIUM 125 MCG
25 TABLET ORAL DAILY
Refills: 0 | Status: DISCONTINUED | OUTPATIENT
Start: 2022-11-15 | End: 2022-11-17

## 2022-11-15 RX ORDER — CEFTRIAXONE 500 MG/1
2000 INJECTION, POWDER, FOR SOLUTION INTRAMUSCULAR; INTRAVENOUS EVERY 24 HOURS
Refills: 0 | Status: DISCONTINUED | OUTPATIENT
Start: 2022-11-16 | End: 2022-11-17

## 2022-11-15 RX ORDER — ONDANSETRON 8 MG/1
4 TABLET, FILM COATED ORAL EVERY 8 HOURS
Refills: 0 | Status: DISCONTINUED | OUTPATIENT
Start: 2022-11-15 | End: 2022-11-17

## 2022-11-15 RX ORDER — VANCOMYCIN HCL 1 G
VIAL (EA) INTRAVENOUS
Refills: 0 | Status: DISCONTINUED | OUTPATIENT
Start: 2022-11-15 | End: 2022-11-15

## 2022-11-15 RX ORDER — SODIUM CHLORIDE 9 MG/ML
1000 INJECTION, SOLUTION INTRAVENOUS ONCE
Refills: 0 | Status: COMPLETED | OUTPATIENT
Start: 2022-11-15 | End: 2022-11-15

## 2022-11-15 RX ORDER — LANOLIN ALCOHOL/MO/W.PET/CERES
3 CREAM (GRAM) TOPICAL AT BEDTIME
Refills: 0 | Status: DISCONTINUED | OUTPATIENT
Start: 2022-11-15 | End: 2022-11-17

## 2022-11-15 RX ORDER — MEROPENEM 1 G/30ML
1000 INJECTION INTRAVENOUS EVERY 8 HOURS
Refills: 0 | Status: DISCONTINUED | OUTPATIENT
Start: 2022-11-15 | End: 2022-11-15

## 2022-11-15 RX ORDER — CEFTRIAXONE 500 MG/1
2000 INJECTION, POWDER, FOR SOLUTION INTRAMUSCULAR; INTRAVENOUS EVERY 24 HOURS
Refills: 0 | Status: DISCONTINUED | OUTPATIENT
Start: 2022-11-15 | End: 2022-11-15

## 2022-11-15 RX ORDER — ACETAMINOPHEN 500 MG
650 TABLET ORAL EVERY 6 HOURS
Refills: 0 | Status: DISCONTINUED | OUTPATIENT
Start: 2022-11-15 | End: 2022-11-15

## 2022-11-15 RX ORDER — BUPROPION HYDROCHLORIDE 150 MG/1
150 TABLET, EXTENDED RELEASE ORAL DAILY
Refills: 0 | Status: DISCONTINUED | OUTPATIENT
Start: 2022-11-15 | End: 2022-11-17

## 2022-11-15 RX ORDER — CEFTRIAXONE 500 MG/1
2000 INJECTION, POWDER, FOR SOLUTION INTRAMUSCULAR; INTRAVENOUS ONCE
Refills: 0 | Status: COMPLETED | OUTPATIENT
Start: 2022-11-15 | End: 2022-11-15

## 2022-11-15 RX ORDER — SENNA PLUS 8.6 MG/1
2 TABLET ORAL AT BEDTIME
Refills: 0 | Status: DISCONTINUED | OUTPATIENT
Start: 2022-11-15 | End: 2022-11-15

## 2022-11-15 RX ORDER — POLYETHYLENE GLYCOL 3350 17 G/17G
17 POWDER, FOR SOLUTION ORAL DAILY
Refills: 0 | Status: DISCONTINUED | OUTPATIENT
Start: 2022-11-15 | End: 2022-11-15

## 2022-11-15 RX ORDER — VANCOMYCIN HCL 1 G
1250 VIAL (EA) INTRAVENOUS EVERY 12 HOURS
Refills: 0 | Status: DISCONTINUED | OUTPATIENT
Start: 2022-11-15 | End: 2022-11-15

## 2022-11-15 RX ORDER — ACETAMINOPHEN 500 MG
650 TABLET ORAL EVERY 6 HOURS
Refills: 0 | Status: DISCONTINUED | OUTPATIENT
Start: 2022-11-15 | End: 2022-11-17

## 2022-11-15 RX ORDER — VANCOMYCIN HCL 1 G
1500 VIAL (EA) INTRAVENOUS ONCE
Refills: 0 | Status: COMPLETED | OUTPATIENT
Start: 2022-11-15 | End: 2022-11-15

## 2022-11-15 RX ADMIN — Medication 650 MILLIGRAM(S): at 04:58

## 2022-11-15 RX ADMIN — Medication 300 MILLIGRAM(S): at 04:11

## 2022-11-15 RX ADMIN — MEROPENEM 100 MILLIGRAM(S): 1 INJECTION INTRAVENOUS at 07:00

## 2022-11-15 RX ADMIN — Medication 650 MILLIGRAM(S): at 05:46

## 2022-11-15 RX ADMIN — CEFTRIAXONE 100 MILLIGRAM(S): 500 INJECTION, POWDER, FOR SOLUTION INTRAMUSCULAR; INTRAVENOUS at 20:00

## 2022-11-15 RX ADMIN — SODIUM CHLORIDE 1000 MILLILITER(S): 9 INJECTION, SOLUTION INTRAVENOUS at 18:34

## 2022-11-15 RX ADMIN — SODIUM CHLORIDE 1000 MILLILITER(S): 9 INJECTION, SOLUTION INTRAVENOUS at 19:27

## 2022-11-15 NOTE — DISCHARGE NOTE PROVIDER - HOSPITAL COURSE
A 22-year-old female with pmhx of recurrent UTIs, hypothyroidism, anxiety, presented to the ED today ( 11/14/22) complaining of abdominal pain and constipation for the past 4 days. According to the patient, the pain is dull, located in the left side of the abdomen, moderate in intensity, continuous without any radiation, no exacerbating or relieving factors. Additionally, she has been having fevers (not measured at home), chills, and a mild suprapubic pain. She also had urinary frequency. Denies any changes in urine color, no dysuria or burning sensation on urination. The patient went to the urgent care yesterday, and was prescribed a one week course of antibiotics but did not feel that it helped so she came to the ED.     She has a history of recurrent UTIs, latest one was in June 2021 which she took antibiotics for. According to the patient, she approximately had around 8 UTIs in the past 3 years. Additionally, she had a kidney infection when she was in [place] (she used to live there) in 2019 that required hospitalization and IV antibiotics.       T(C): 37.6, Max: 38.2   HR: 96  (96 - 123)  BP: 115/70   RR: 16   SpO2: 97%                  13.3   13.93 )-----------( 270      ( 14 Nov 2022 14:43 )             39.4     132<L>  |  98  |  6<L>  ----------------------------<  103<H>  4.5   |  22  |  0.8    Ca    8.8      14 Nov 2022 14:43  TPro  7.0  /  Alb  4.4  /  TBili  0.4  /  DBili  <0.2  /  AST  14  /  ALT  8   /  AlkPhos  83  11-14  UA showed +LE, +Leukocytes, + bacteria. FU urine culture and blood culture    CTAP w oral and IV contrast showed; left renal striated nephrogram (consistent with pyelonephritis), 1.4 left upper pole hypodensity which may represent developing abscess.  Loading dose vancomycin 20 mg/kg IV then continue with 15 mg IV q12h (MRSA coverage)+ meropenem 1 g IV q8h (Gram - bacilli coverage)     Patient admitted to Medicine for management of Pyelo. ID saw patient, recommended Rocephin 2g. Patient symptoms improved. Patient will be dc'd with Cipro 500 BID for 7 days. Given patients history of reccurent UTI's, needs OP f/u with Urology.     Patient seen and examined, stable for dc     A 22-year-old woman with a pmhx of recurrent UTIs, hypothyroidism, and anxiety came to the ED complaining of dull abdominal pain and constipation for 4 days. She was admitted for workup and treatment of pyelonephritis w suspected abscess.     #Pyelonephritis  #Developing renal abscess  #Recurrent UTIs  - Patient has a hx of recurrent UTIs including pyelonephritis in 2019   - Leukocytosis. UA showed +LE, +Leukocytes, + bacteria. FU urine culture and blood culture    - CTAP w oral and IV contrast showed; left renal striated nephrogram (consistent with pyelonephritis), 1.4 left upper pole hypodensity which may represent developing abscess.   - s/p one dose of rocephin in the ED. Patient also took antibiotics from urgent care yesterday  - Initially, Loading dose vancomycin 20 mg/kg IV then continue with 15 mg IV q12h (MRSA coverage)+ meropenem 1 g IV q8h (Gram - bacilli coverage)  - 11/15 - symptoms improving per the patient   Plan   - ID consult  - Will switch to Rocephin 2g   - dc w/ Cipro BID for 7 days     #Hypothyroidism   - c/w levothyroxine   Plan   - TSH pending     #Anxiety  - c/w bupropion     # constipation  - Senna and miralax     #Misc;  - GI prophylaxis: none  - DVT prophylaxis:  - Diet; regular   - Dispo: dc         A 22-year-old female with pmhx of recurrent UTIs, hypothyroidism, anxiety, presented to the ED complaining of abdominal pain and constipation for the past 4 days, located in the left side of the abdomen, moderate in intensity, continuous without any radiation, she has been having fevers, chills, and a mild suprapubic pain. She also had urinary frequency. She has a history of recurrent UTIs, latest one was in June 2021 which she took antibiotics for. According to the patient, she approximately had around 8 UTIs in the past 3 years. Additionally, she had a kidney infection when she was in [place] (she used to live there) in 2019 that required hospitalization and IV antibiotics. In the ED vital signs were stable, labs showed WBC 13K, UA showed +LE, +Leukocytes, + bacteria. Abdomen CT showed left renal striated nephrogram (consistent with pyelonephritis), 1.4 left upper pole hypodensity which may represent developing abscess. She received IV antibiotics, Patient admitted to Medicine for management of Pyelo. ID saw patient, recommended Rocephin 2g. Patient symptoms improved. Patient will be dc'd with Cipro 500 BID for 7 days. Given patients history of reccurent UTI's, needs OP f/u with Urology.     A/P:   Acute Left Pyelonephritis  #Developing renal abscess  #Recurrent UTIs  - Patient has a hx of recurrent UTIs including pyelonephritis in 2019   - Leukocytosis. UA showed +LE, +Leukocytes, + bacteria. FU urine culture and blood culture    - CTAP w oral and IV contrast showed; left renal striated nephrogram (consistent with pyelonephritis), 1.4 left upper pole hypodensity which may represent developing abscess.   - s/p one dose of rocephin in the ED. Patient also took antibiotics from urgent care yesterday  - Initially, Loading dose vancomycin 20 mg/kg IV then continue with 15 mg IV q12h (MRSA coverage)+ meropenem 1 g IV q8h (Gram - bacilli coverage)  - 11/15 - symptoms improving per the patient   Plan   - ID consult  - Will switch to Rocephin 2g   - dc w/ Cipro BID for 7 days     #Hypothyroidism   - c/w levothyroxine   Plan   - TSH pending     #Anxiety  - c/w bupropion     # constipation  - Senna and miralax     #Misc;  - GI prophylaxis: none  - DVT prophylaxis:  - Diet; regular   - Dispo: dc         A 22-year-old female with pmhx of recurrent UTIs, hypothyroidism, anxiety, presented to the ED complaining of abdominal pain and constipation for the past 4 days, located in the left side of the abdomen, moderate in intensity, continuous without any radiation, she has been having fevers, chills, and a mild suprapubic pain. She also had urinary frequency. She has a history of recurrent UTIs, latest one was in June 2021 which she took antibiotics for. According to the patient, she approximately had around 8 UTIs in the past 3 years. Additionally, she had a kidney infection when she was in [place] (she used to live there) in 2019 that required hospitalization and IV antibiotics. In the ED vital signs were stable, labs showed WBC 13K, UA showed +LE, +Leukocytes, + bacteria. Abdomen CT showed left renal striated nephrogram (consistent with pyelonephritis), 1.4 left upper pole hypodensity which may represent developing abscess. She received IV antibiotics, Patient admitted to Medicine for management of Pyelo. ID saw patient, recommended Rocephin 2g. Patient symptoms improved. Patient will be dc'd with Cipro 500 BID for 7 days. Given patients history of reccurent UTI's, needs OP f/u with Urology.     A/P:   Acute Left Pyelonephritis: patient with left side abdominal pain, fever.  Sepsis on admission:   Patient has a hx of recurrent UTIs including pyelonephritis in 2019   Leukocytosis. UA showed +LE, +Leukocytes, + bacteria. FU urine culture and blood culture    Abdomen and Pelvis CT with contrast showed; left renal striated nephrogram (consistent with pyelonephritis), 1.4 left upper pole hypodensity which may represent developing abscess.  Cipro BID for 7 days, outpatient follow up with Urology for evaluation of recurrent UTI and monitor possibly small abscess.     Hypothyroidism   - c/w levothyroxine       #Anxiety  - c/w bupropion      constipation  - Senna and miralax            A 22-year-old female with pmhx of recurrent UTIs, hypothyroidism, anxiety, presented to the ED complaining of abdominal pain and constipation for the past 4 days, located in the left side of the abdomen, moderate in intensity, continuous without any radiation, she has been having fevers, chills, and a mild suprapubic pain. She also had urinary frequency. She has a history of recurrent UTIs, latest one was in June 2021 which she took antibiotics for. According to the patient, she approximately had around 8 UTIs in the past 3 years. Additionally, she had a kidney infection when she was in [place] (she used to live there) in 2019 that required hospitalization and IV antibiotics. In the ED vital signs were stable, labs showed WBC 13K, UA showed +LE, +Leukocytes, + bacteria. Abdomen CT showed left renal striated nephrogram (consistent with pyelonephritis), 1.4 left upper pole hypodensity which may represent developing abscess. She received IV antibiotics, Patient admitted to Medicine for management of Pyelo. ID saw patient, recommended Rocephin 2g. Patient symptoms improved. Patient will be dc'd with Cipro 500 BID for 7 days. Given patients history of reccurent UTI's, needs OP f/u with Urology.     A/P:   Acute Left Pyelonephritis: patient with left side abdominal pain, fever.  Sepsis on admission:   Patient has a hx of recurrent UTIs including pyelonephritis in 2019   Leukocytosis. UA showed +LE, +Leukocytes, + bacteria. FU urine culture and blood culture    Abdomen and Pelvis CT with contrast showed; left renal striated nephrogram (consistent with pyelonephritis), 1.4 left upper pole hypodensity which may represent developing abscess.  Cipro BID for 7 days, outpatient follow up with Urology for evaluation of recurrent UTI and monitor possibly small abscess.     Hypothyroidism   - c/w levothyroxine       #Anxiety  - c/w bupropion      constipation  - Senna and miralax     Attending attestation:   Patient is feeing better, no abdominal pain or fever.   I discussed with urology, possible abscess is too small to be drained, recommended to continue with Antibiotics and follow up outpatient in one week, patient was instructed to come back to ED if she developed fever again.

## 2022-11-15 NOTE — ED ADULT NURSE NOTE - OBJECTIVE STATEMENT
Patient c/o spiking fever about an hour ago tmax 102f took tylenol at home. Patient was recently discharged for kidney infection and was instructed to come back if having fever. Patient afebrile in triage. No signs of distress noted.

## 2022-11-15 NOTE — CONSULT NOTE ADULT - ASSESSMENT
22-year-old female with pmhx of recurrent UTIs, hypothyroidism, anxiety, presented to the ED today ( 11/14/22) complaining of abdominal pain and constipation for the past 4 days.    11/14 CT Abdomen and Pelvis w/ Oral Cont and w/ IV Cont   Left renal striated nephrogram consistent with   pyelonephritis. 1.4 cm left upper pole hypodensity may represent a   developing abscess.    IMPRESSION;  Sepsis ( T 100.8, WBC 13.9 ) secondary to acute left sided pyelonephritis with possible left upper pole renal abscess    RECOMMENDATIONS;  BCx  UCx   evaluation  IVR for possible diagnostic drainage of collection   Rocephin 2 gm iv q24h

## 2022-11-15 NOTE — DISCHARGE NOTE PROVIDER - CARE PROVIDER_API CALL
Patrick Dunham)  Family Medicine  [address] [place] [id number]  Phone: [phone]  Fax: [phone]  Follow Up Time:     Mert Newman)  Urology  [address] [place] [address]  Phone: [phone]  Fax: [phone]  Follow Up Time:

## 2022-11-15 NOTE — DISCHARGE NOTE NURSING/CASE MANAGEMENT/SOCIAL WORK - NSDCPEFALRISK_GEN_ALL_CORE
For information on Fall & Injury Prevention, visit: [email or web address] OR  [email or web address] OR  https://www.cdc.gov/steadi/patient.html

## 2022-11-15 NOTE — DISCHARGE NOTE PROVIDER - NSDCCPCAREPLAN_GEN_ALL_CORE_FT
PRINCIPAL DISCHARGE DIAGNOSIS  Diagnosis: Pyelonephritis  Assessment and Plan of Treatment: You were found to have a UTI on admission. You were treated with antibiotics and your symptoms improved. You will take antibiotics for 7 more days. Please continue to take your medications as prescribed and follow up outpatient in one to two weeks with your PCP and Urologist.   Kidney infection (pyelonephritis) is a type of urinary tract infection (UTI) that generally begins in your urethra or bladder and travels to one or both of your kidneys.  A kidney infection requires prompt medical attention. If not treated properly, a kidney infection can permanently damage your kidneys or the bacteria can spread to your bloodstream and cause a life-threatening infection.  Bacteria that enter your urinary tract through the tube that carries urine from your body (urethra) can multiply and travel to your kidneys. This is the most common cause of kidney infections.  Drink fluids, especially water.  Avoid delaying urination when you feel the urge to urinate.  Empty the bladder after intercourse. Urinating as soon as possible after intercourse helps clear bacteria from the urethra, reducing your risk of infection.  Antibiotics are the first line of treatment for kidney infections. Which drugs you use and for how long depend on your health and the bacteria found in your urine tests.  Call 911 and return to the emergency room if you have chest pain, difficulty breathing, high fevers, worsening of your symptoms, feel unwell or have nausea and vomiting.  PLEASE CONTINUE TO TAKE YOUR MEDICATIONS AS PRESCRIBED AND FOLLOW UP OUTPATIENT IN ONE TO TWO WEEKS WITH YOUR PCP AND UROLOGIST.

## 2022-11-15 NOTE — H&P ADULT - HISTORY OF PRESENT ILLNESS
22-year-old female with recurrent UTIs, hypothyroidism, on 11/14 was found to have left pyelonephritis & 1.4 cm possible left upper renal pole developing abscess. At the time she initially presented to the ED, she was complaining of abdominal pain and constipation for the past 4 days, located in the left side of the abdomen, moderate in intensity, continuous without any radiation, she has been having fevers, chills, and a mild suprapubic pain. She also had urinary frequency. She has a history of recurrent UTIs, latest one was in June 2021 which she took antibiotics for. According to the patient, she approximately had around 8 UTIs in the past 3 years. Additionally, she had a kidney infection when she was in [place] (she used to live there) in 2019 that required hospitalization and IV antibiotics. She was discharged today and presented back to the ED after having a fever of 103 F at home after being discharged on ciprofloxacin.  ID saw patient last admission recommended Rocephin 2g QD.    WBC 11.7, PMN 77%, improved from 13.9 & 82% respectively  Cr 0.7  lactate 1.4    s/p Ceftriaxone 2 g  s/p LR 1 L    In ED, VS: T 36.1, /69, , RR 18, SpO2 96% RA 22-year-old female with recurrent UTIs, hypothyroidism, on 11/14 was found to have left pyelonephritis & 1.4 cm possible left upper renal pole developing abscess. At the time she initially presented to the ED, she was complaining of abdominal pain and constipation for the past 4 days, located in the left side of the abdomen, moderate in intensity, continuous without any radiation, she has been having fevers, chills, and a mild suprapubic pain. She also had urinary frequency. She has a history of recurrent UTIs, latest one was in June 2021 which she took antibiotics for. According to the patient, she approximately had around 8 UTIs in the past 3 years, 2 UTIs in past year. Additionally, she had a kidney infection when she was in [place] (she used to live there) in 2019 that required hospitalization and IV antibiotics. She was discharged today and presented back to the ED after having a fever of 103 F at home after being discharged on ciprofloxacin.  ID saw patient last admission recommended Rocephin 2g QD.    WBC 11.7, PMN 77%, improved from 13.9 & 82% respectively  Cr 0.7  lactate 1.4    s/p Ceftriaxone 2 g  s/p LR 1 L    In ED, VS: T 36.1, /69, , RR 18, SpO2 96% RA

## 2022-11-15 NOTE — PROGRESS NOTE ADULT - SUBJECTIVE AND OBJECTIVE BOX
Hospital Day:  1d    Subjective:    Patient is a 22y old  Female who presents with a chief complaint of Pyelonephritis (2022 22:47)      Admitted to medicine for a primary diagnosis of     Past Medical Hx:   Hypothyroid    Anxiety      Past Sx:  History of       Allergies:  No Known Allergies    Current Meds:   Standng Meds:  enoxaparin Injectable 40 milliGRAM(s) SubCutaneous every 24 hours  influenza   Vaccine 0.5 milliLiter(s) IntraMuscular once  meropenem  IVPB 1000 milliGRAM(s) IV Intermittent every 8 hours  polyethylene glycol 3350 17 Gram(s) Oral daily  senna 2 Tablet(s) Oral at bedtime  vancomycin  IVPB      vancomycin  IVPB 1250 milliGRAM(s) IV Intermittent every 12 hours    PRN Meds:  acetaminophen     Tablet .. 650 milliGRAM(s) Oral every 6 hours PRN Temp greater or equal to 38C (100.4F), Mild Pain (1 - 3)    HOME MEDICATIONS:      Vital Signs:   T(F): 99.2 (11-15-22 @ 04:18), Max: 100.8 (22 @ 13:38)  HR: 87 (11-15-22 @ 04:18) (87 - 123)  BP: 107/58 (11-15-22 @ 04:18) (102/65 - 120/66)  RR: 18 (11-15-22 @ 04:18) (16 - 20)  SpO2: 97% (22 @ 22:00) (97% - 100%)        Physical Exam:   GENERAL: NAD  HEENT: NCAT  CHEST/LUNG: CTAB  HEART: Regular rate and rhythm; s1 s2 appreciated, No murmurs, rubs, or gallops  ABDOMEN: Soft, Nontender, Nondistended; Bowel sounds present  EXTREMITIES: No LE edema b/l  SKIN: no rashes, no new lesions  NERVOUS SYSTEM:  Alert & Oriented X3  LINES/CATHETERS:        Labs:                         12.3   11.66 )-----------( 267      ( 15 Nov 2022 04:46 )             36.8     Neutophil% 75.0, Lymphocyte% 13.2, Monocyte% 11.0, Bands% 0.4 11-15-22 @ 04:46    15 Nov 2022 04:46    137    |  102    |  6      ----------------------------<  129    4.2     |  25     |  0.7      Ca    8.7        15 Nov 2022 04:46    TPro  6.5    /  Alb  4.0    /  TBili  0.2    /  DBili  x      /  AST  14     /  ALT  10     /  AlkPhos  81     15 Nov 2022 04:46        Amylase --, Lipase 16, 11-14-22 @ 14:43              Urinalysis Basic - ( 2022 14:43 )    Color: Yellow / Appearance: Slightly Turbid / S.014 / pH: x  Gluc: x / Ketone: Moderate  / Bili: Negative / Urobili: <2 mg/dL   Blood: x / Protein: 30 mg/dL / Nitrite: Negative   Leuk Esterase: Large / RBC: 6 /HPF /  /HPF   Sq Epi: x / Non Sq Epi: 3 /HPF / Bacteria: Moderate

## 2022-11-15 NOTE — DISCHARGE NOTE PROVIDER - ATTENDING DISCHARGE PHYSICAL EXAMINATION:
PHYSICAL EXAM:  GENERAL: NAD, well-developed.  HEAD:  Atraumatic, Normocephalic.  EYES: EOMI, PERRLA, conjunctiva and sclera clear.  NECK: Supple, No JVD.  CHEST/LUNG: Clear to auscultation bilaterally; No wheeze.  HEART: Regular rate and rhythm; S1 S2.   ABDOMEN: Soft, Nontender, Nondistended; Bowel sounds present.  EXTREMITIES:  2+ Peripheral Pulses, No clubbing, cyanosis, or edema.  PSYCH: AAOx3.  NEUROLOGY: non-focal.  SKIN: No rashes or lesions.

## 2022-11-15 NOTE — DISCHARGE NOTE PROVIDER - NSDCMRMEDTOKEN_GEN_ALL_CORE_FT
Cipro 500 mg oral tablet: 1 tab(s) orally 2 times a day    BUPROP 24 XL 150MG TAB: 1 tab(s) orally once a day  cefpodoxime 200 mg oral tablet: 1 tab(s) orally 2 times a day   cefTRIAXone: 2 gram(s) intravenous once a day  LEVOTHYROXIN 25MCG TAB: 1 tab(s) orally once a day

## 2022-11-15 NOTE — H&P ADULT - ATTENDING COMMENTS
21 YO F w/ a PMH of recurrent UTIs, hypothyroidism, and recent diagnosis of left-sided pyelonephritis w/ developing abscess who presents to the hospital w/ a c/o recurrent fever since being discharged this AM. Associated w/ left-sided ABD pain. Shalom any hematuria. ROS is negative except as above.     In the ED, pt started on IVFs (LR) and IV ABXs (Ceftria).     FMHx:   -No family Hx of early cardiac death, CAD, asthma, or genetic disorders identified  -A comprehensive family history was taken and there were no identified disorders in the pt's relatives    Physical exam shows pt in NAD. VSS, afebrile, not hypoxic on RA. A&Ox3. Neuro exam without deficits, motor/sensory intact, no dysarthria, no facial asymmetry. Muscle strength/sensation intact. CTA B/L with no W/C/R. RRR, no M/G/R. ABD is soft and non-tender, normoactive BSs; no CVA TTP. LEs without swelling. No rashes. Labs and radiology as above.     Left-sided ABD pain + Fevers due to left-sided pyelonephritis w/ developing renal pole abscess; No sepsis present on admission. FU cultures. IVFs (LR). PRN pain meds. IV ABXs (Ceftriaxone).    Hx of recurrent UTIs and hypothyroidism. Restart home meds, except as stated above. DVT PPX. Inform PCP of pt's admission to hospital. My note supersedes the residents note.     Date seen by Attendin/15/22

## 2022-11-15 NOTE — DISCHARGE NOTE NURSING/CASE MANAGEMENT/SOCIAL WORK - PATIENT PORTAL LINK FT
You can access the FollowMyHealth Patient Portal offered by [facility] by registering at the following website: [email or web address]. By joining [facility]’s FollowMyHealth portal, you will also be able to view your health information using other applications (apps) compatible with our system.

## 2022-11-15 NOTE — H&P ADULT - ASSESSMENT
A 22-year-old female with pmhx of recurrent UTIs, hypothyroidism presenting with pyelonephritis and possible left renal abscess. She is hemodynamically stable    #Acute Left Pyelonephritis: patient with left side abdominal pain, fever  # Possible left renal abscess  # SIRS on admission  # Sepsis-2 & Sepsis-3 -ve   * Patient has a hx of recurrent UTIs including pyelonephritis in 2019   *Abdomen and Pelvis CT with contrast showed; left renal striated nephrogram (consistent with pyelonephritis), 1.4 left upper pole hypodensity which may represent developing abscess.  - outpatient follow up with Urology for evaluation of recurrent UTI and monitor possibly small abscess.  - ceftriaxone 2 g QD  - c/s IR for possible diagnostic drainage    # Hypothyroidism   - c/w levothyroxine     #Anxiety  - c/w bupropion     # constipation  - Senna and Miralax     Diet Regular, NPO after midnight for possible abscess drainage   A 22-year-old female with pmhx of recurrent UTIs, hypothyroidism presenting with pyelonephritis and possible left renal abscess. She is hemodynamically stable    # Acute Left Pyelonephritis: patient had left side abdominal pain (resolved), fever  # Possible left renal abscess  # SIRS on admission  # Sepsis-2 & Sepsis-3 -ve   * Patient has a hx of recurrent UTIs including pyelonephritis in 2019   *Abdomen and Pelvis CT with contrast showed; left renal striated nephrogram (consistent with pyelonephritis), 1.4 left upper pole hypodensity which may represent developing abscess.  * Denies IV Drug use or recent hospitalization  - outpatient follow up with Urology for evaluation of recurrent UTI and monitor possibly small abscess.  - ceftriaxone 2 g QD  - c/s IR for possible diagnostic drainage  - f/u BCx & UCx (collected on previous admission)    # Hypothyroidism   - c/w levothyroxine     #Anxiety  - c/w bupropion      Diet Regular, NPO after midnight for possible abscess drainage

## 2022-11-15 NOTE — ED ADULT TRIAGE NOTE - CHIEF COMPLAINT QUOTE
Presented to ED c/o spiking fever about an hour ago tmax 102f took tylenol. Recently discharged for kidney infection and was instructed to come back if having fever.

## 2022-11-15 NOTE — PROGRESS NOTE ADULT - ASSESSMENT
A 22-year-old woman with a pmhx of recurrent UTIs, hypothyroidism, and anxiety came to the ED complaining of dull abdominal pain and constipation for 4 days. She was admitted for workup and treatment of pyelonephritis w suspected abscess.     #Pyelonephritis  #Developing renal abscess  #Recurrent UTIs  - Patient has a hx of recurrent UTIs including pyelonephritis in 2019   - Leukocytosis. UA showed +LE, +Leukocytes, + bacteria. FU urine culture and blood culture    - CTAP w oral and IV contrast showed; left renal striated nephrogram (consistent with pyelonephritis), 1.4 left upper pole hypodensity which may represent developing abscess.   - s/p one dose of rocephin in the ED. Patient also took antibiotics from urgent care yesterday  - Initially, Loading dose vancomycin 20 mg/kg IV then continue with 15 mg IV q12h (MRSA coverage)+ meropenem 1 g IV q8h (Gram - bacilli coverage)  - 11/15 - symptoms improving per the patient   Plan   - ID consult pending   - Will switch to Rocephin 2g     #Hypothyroidism   - c/w levothyroxine   Plan   - TSH pending     #Anxiety  - c/w bupropion     # constipation  - Senna and miralax     #Misc;  - GI prophylaxis: none  - DVT prophylaxis:  - Diet; regular   - Dispo: medicine floor

## 2022-11-15 NOTE — H&P ADULT - NSHPPHYSICALEXAM_GEN_ALL_CORE
VITAL SIGNS: AFebrile, vital signs stable  CONSTITUTIONAL: Well-developed; well-nourished; in no acute distress.  SKIN: Skin exam is warm and dry, no acute rash.  HEAD: Normocephalic; atraumatic.  EYES: Pupils equal round reactive to light, Extraocular movements intact; conjunctiva and sclera clear.  ENT: No nasal discharge; airway clear. Moist mucus membranes.  NECK: Supple; non tender. No rigidity  CARD: Regular rate and rhythm. Normal S1, S2; no murmurs, gallops, or rubs.  RESP: Lungs clear to auscultation bilaterally. No wheezes, rales or rhonchi.  ABD: Abdomen soft; non-tender, no suprapubic tenderness; non-distended;  No costovertebral angle tenderness.   EXT: Normal ROM. No clubbing, cyanosis or edema. No calf tenderness to palpation.  NEURO: Alert and oriented x 3. No focal deficits.  PSYCH: Cooperative, appropriate.

## 2022-11-15 NOTE — ED PROVIDER NOTE - PHYSICAL EXAMINATION
Physical Exam    Vital Signs: I have reviewed the initial vital signs.  Constitutional: appears stated age, no acute distress  Eyes: Conjunctiva pink, Sclera clear  Cardiovascular: S1 and S2, regular rate, regular rhythm, well-perfused extremities, radial pulses equal and 2+, pedal pulses 2+ and equal  Respiratory: unlabored respiratory effort, clear to auscultation bilaterally no wheezing, rales and rhonchi  Gastrointestinal: soft, non-tender abdomen, no pulsatile mass, normal bowl sounds, no cva ttp  Musculoskeletal: supple neck, no lower extremity edema, no midline tenderness  Integumentary: warm, dry, no rash  Neurologic: awake, alert, nvi

## 2022-11-15 NOTE — ED PROVIDER NOTE - ATTENDING APP SHARED VISIT CONTRIBUTION OF CARE
22F PMH recurrent UTIs, hypothyroid, p/w fever. recent admission for pyelo w small abscess seen on CT, treated w IV abx and dc this morning 9am.  developed fever 102.5 at 2pm today and took tylenol.  she called the hospital and was told to come to ED.  was afebrile while admitted to hospital. dc home on cipro which she took. c/o R flank pain as well. no nvdc. no dysuria, freq, hematuria. no cough, uri sx. no cp, sob.     on exam, AFVSS, well genesis nad, ncat, eomi, perrla, mmm, lctab, rrr nl s1s2 no mrg, abd soft ntnd, +RCVAT, aaox3, no focal deficits, no le edema or calf ttp,     a/p; UTI/pyelo w small abscess on cipro, now with fever, will do labs, cultures, UA/cx, admit for IV abx, r/o bacteremia, also needs further work up/treatment as inpt for abscess with IR/ as per prior ID note

## 2022-11-15 NOTE — ED PROVIDER NOTE - OBJECTIVE STATEMENT
23 yo female, discharged this am for pyelonephritis and ? abscess on ureter, pt dc on cipro 500 bid, took meds, this evening had 103 fever, advised to come back to ed, no pain or radiation now. Denies  chills, cp, sob, nvd, dysuria, hematuria.

## 2022-11-15 NOTE — PATIENT PROFILE ADULT - NSPROPTRIGHTNOTIFY_GEN_A_NUR
Discussed with the patient's family this AM.  Comfort measures with hospice.  Discontinue all unnecessary care to focus on the patient's comfort.  Hospice Liaison notified.  Full note to follow.    Matteo Ayala MD    declines

## 2022-11-15 NOTE — ED PROVIDER NOTE - CLINICAL SUMMARY MEDICAL DECISION MAKING FREE TEXT BOX
a/p; UTI/pyelo w small abscess on cipro, now with fever, will do labs, cultures, UA/cx, admit for IV abx, r/o bacteremia, also needs further work up/treatment as inpt for abscess with IR/ as per prior ID note

## 2022-11-15 NOTE — DISCHARGE NOTE NURSING/CASE MANAGEMENT/SOCIAL WORK - NSTRANSFERBELONGINGSDISPO_GEN_A_NUR
Department of Anesthesiology  Postprocedure Note    Patient: Ji Dugan  MRN: [id number]  YOB: 1990  Date of evaluation: 5/21/2020  Time:  9:36 AM     Procedure Summary     Date:  05/21/20 Room / Location:  [address]    Anesthesia Start:  0915 Anesthesia Stop:  2337    Procedure:  COLONOSCOPY DIAGNOSTIC (N/A ) Diagnosis:  (RLQ PAIN - ABN CT)    Surgeon:  Ashlyn Jones MD Responsible Provider:  Ralston Lundborg, APRN - CRNA    Anesthesia Type:  general ASA Status:  2          Anesthesia Type: general    Tara Phase I:      Tara Phase II:      Last vitals: Reviewed and per EMR flowsheets.        Anesthesia Post Evaluation    Patient location during evaluation: bedside  Patient participation: complete - patient participated  Level of consciousness: awake and alert  Pain score: 0  Airway patency: patent  Nausea & Vomiting: no nausea and no vomiting  Complications: no  Cardiovascular status: hemodynamically stable  Respiratory status: acceptable  Hydration status: euvolemic
with patient

## 2022-11-16 PROBLEM — F41.9 ANXIETY DISORDER, UNSPECIFIED: Chronic | Status: ACTIVE | Noted: 2022-11-14

## 2022-11-16 PROBLEM — E03.9 HYPOTHYROIDISM, UNSPECIFIED: Chronic | Status: ACTIVE | Noted: 2022-11-14

## 2022-11-16 LAB
A1C WITH ESTIMATED AVERAGE GLUCOSE RESULT: 5.2 % — SIGNIFICANT CHANGE UP (ref 4–5.6)
ALBUMIN SERPL ELPH-MCNC: 3.4 G/DL — LOW (ref 3.5–5.2)
ALP SERPL-CCNC: 74 U/L — SIGNIFICANT CHANGE UP (ref 30–115)
ALT FLD-CCNC: 16 U/L — SIGNIFICANT CHANGE UP (ref 0–41)
ANION GAP SERPL CALC-SCNC: 10 MMOL/L — SIGNIFICANT CHANGE UP (ref 7–14)
APPEARANCE UR: CLEAR — SIGNIFICANT CHANGE UP
APTT BLD: 27.6 SEC — SIGNIFICANT CHANGE UP (ref 27–39.2)
AST SERPL-CCNC: 17 U/L — SIGNIFICANT CHANGE UP (ref 0–41)
BASOPHILS # BLD AUTO: 0.03 K/UL — SIGNIFICANT CHANGE UP (ref 0–0.2)
BASOPHILS NFR BLD AUTO: 0.3 % — SIGNIFICANT CHANGE UP (ref 0–1)
BILIRUB SERPL-MCNC: <0.2 MG/DL — SIGNIFICANT CHANGE UP (ref 0.2–1.2)
BILIRUB UR-MCNC: NEGATIVE — SIGNIFICANT CHANGE UP
BUN SERPL-MCNC: 6 MG/DL — LOW (ref 10–20)
CALCIUM SERPL-MCNC: 8.9 MG/DL — SIGNIFICANT CHANGE UP (ref 8.4–10.4)
CHLORIDE SERPL-SCNC: 104 MMOL/L — SIGNIFICANT CHANGE UP (ref 98–110)
CHOLEST SERPL-MCNC: 135 MG/DL — SIGNIFICANT CHANGE UP
CO2 SERPL-SCNC: 26 MMOL/L — SIGNIFICANT CHANGE UP (ref 17–32)
COLOR SPEC: SIGNIFICANT CHANGE UP
CREAT SERPL-MCNC: 0.6 MG/DL — LOW (ref 0.7–1.5)
CULTURE RESULTS: NO GROWTH — SIGNIFICANT CHANGE UP
DIFF PNL FLD: NEGATIVE — SIGNIFICANT CHANGE UP
EGFR: 128 ML/MIN/1.73M2 — SIGNIFICANT CHANGE UP
EOSINOPHIL # BLD AUTO: 0.05 K/UL — SIGNIFICANT CHANGE UP (ref 0–0.7)
EOSINOPHIL NFR BLD AUTO: 0.5 % — SIGNIFICANT CHANGE UP (ref 0–8)
ESTIMATED AVERAGE GLUCOSE: 103 MG/DL — SIGNIFICANT CHANGE UP (ref 68–114)
GLUCOSE SERPL-MCNC: 89 MG/DL — SIGNIFICANT CHANGE UP (ref 70–99)
GLUCOSE UR QL: NEGATIVE — SIGNIFICANT CHANGE UP
HCT VFR BLD CALC: 33.7 % — LOW (ref 37–47)
HDLC SERPL-MCNC: 25 MG/DL — LOW
HGB BLD-MCNC: 11.5 G/DL — LOW (ref 12–16)
HIV 1+2 AB+HIV1 P24 AG SERPL QL IA: SIGNIFICANT CHANGE UP
IMM GRANULOCYTES NFR BLD AUTO: 0.2 % — SIGNIFICANT CHANGE UP (ref 0.1–0.3)
INR BLD: 1.07 RATIO — SIGNIFICANT CHANGE UP (ref 0.65–1.3)
KETONES UR-MCNC: NEGATIVE — SIGNIFICANT CHANGE UP
LEUKOCYTE ESTERASE UR-ACNC: NEGATIVE — SIGNIFICANT CHANGE UP
LIPID PNL WITH DIRECT LDL SERPL: 82 MG/DL — SIGNIFICANT CHANGE UP
LYMPHOCYTES # BLD AUTO: 1.53 K/UL — SIGNIFICANT CHANGE UP (ref 1.2–3.4)
LYMPHOCYTES # BLD AUTO: 15.1 % — LOW (ref 20.5–51.1)
MAGNESIUM SERPL-MCNC: 2 MG/DL — SIGNIFICANT CHANGE UP (ref 1.8–2.4)
MCHC RBC-ENTMCNC: 29.4 PG — SIGNIFICANT CHANGE UP (ref 27–31)
MCHC RBC-ENTMCNC: 34.1 G/DL — SIGNIFICANT CHANGE UP (ref 32–37)
MCV RBC AUTO: 86.2 FL — SIGNIFICANT CHANGE UP (ref 81–99)
MONOCYTES # BLD AUTO: 1.14 K/UL — HIGH (ref 0.1–0.6)
MONOCYTES NFR BLD AUTO: 11.3 % — HIGH (ref 1.7–9.3)
NEUTROPHILS # BLD AUTO: 7.33 K/UL — HIGH (ref 1.4–6.5)
NEUTROPHILS NFR BLD AUTO: 72.6 % — SIGNIFICANT CHANGE UP (ref 42.2–75.2)
NITRITE UR-MCNC: NEGATIVE — SIGNIFICANT CHANGE UP
NON HDL CHOLESTEROL: 110 MG/DL — SIGNIFICANT CHANGE UP
NRBC # BLD: 0 /100 WBCS — SIGNIFICANT CHANGE UP (ref 0–0)
PH UR: 7.5 — SIGNIFICANT CHANGE UP (ref 5–8)
PLATELET # BLD AUTO: 251 K/UL — SIGNIFICANT CHANGE UP (ref 130–400)
POTASSIUM SERPL-MCNC: 3.9 MMOL/L — SIGNIFICANT CHANGE UP (ref 3.5–5)
POTASSIUM SERPL-SCNC: 3.9 MMOL/L — SIGNIFICANT CHANGE UP (ref 3.5–5)
PROT SERPL-MCNC: 6.2 G/DL — SIGNIFICANT CHANGE UP (ref 6–8)
PROT UR-MCNC: NEGATIVE — SIGNIFICANT CHANGE UP
PROTHROM AB SERPL-ACNC: 12.2 SEC — SIGNIFICANT CHANGE UP (ref 9.95–12.87)
RBC # BLD: 3.91 M/UL — LOW (ref 4.2–5.4)
RBC # FLD: 13.1 % — SIGNIFICANT CHANGE UP (ref 11.5–14.5)
SODIUM SERPL-SCNC: 140 MMOL/L — SIGNIFICANT CHANGE UP (ref 135–146)
SP GR SPEC: 1 — LOW (ref 1.01–1.03)
SPECIMEN SOURCE: SIGNIFICANT CHANGE UP
TRIGL SERPL-MCNC: 137 MG/DL — SIGNIFICANT CHANGE UP
UROBILINOGEN FLD QL: SIGNIFICANT CHANGE UP
WBC # BLD: 10.1 K/UL — SIGNIFICANT CHANGE UP (ref 4.8–10.8)
WBC # FLD AUTO: 10.1 K/UL — SIGNIFICANT CHANGE UP (ref 4.8–10.8)

## 2022-11-16 PROCEDURE — 99232 SBSQ HOSP IP/OBS MODERATE 35: CPT

## 2022-11-16 RX ORDER — INFLUENZA VIRUS VACCINE 15; 15; 15; 15 UG/.5ML; UG/.5ML; UG/.5ML; UG/.5ML
0.5 SUSPENSION INTRAMUSCULAR ONCE
Refills: 0 | Status: DISCONTINUED | OUTPATIENT
Start: 2022-11-16 | End: 2022-11-17

## 2022-11-16 RX ORDER — MOXIFLOXACIN HYDROCHLORIDE TABLETS, 400 MG 400 MG/1
1 TABLET, FILM COATED ORAL
Qty: 14 | Refills: 0
Start: 2022-11-16 | End: 2022-11-22

## 2022-11-16 RX ORDER — CEFTRIAXONE 500 MG/1
2 INJECTION, POWDER, FOR SOLUTION INTRAMUSCULAR; INTRAVENOUS
Qty: 0 | Refills: 0 | DISCHARGE
Start: 2022-11-16 | End: 2022-11-29

## 2022-11-16 RX ORDER — CIPROFLOXACIN LACTATE 400MG/40ML
1 VIAL (ML) INTRAVENOUS
Qty: 14 | Refills: 0
Start: 2022-11-16 | End: 2022-11-22

## 2022-11-16 RX ADMIN — Medication 25 MICROGRAM(S): at 05:01

## 2022-11-16 RX ADMIN — CEFTRIAXONE 100 MILLIGRAM(S): 500 INJECTION, POWDER, FOR SOLUTION INTRAMUSCULAR; INTRAVENOUS at 18:00

## 2022-11-16 NOTE — CONSULT NOTE ADULT - SUBJECTIVE AND OBJECTIVE BOX
INTERVENTIONAL RADIOLOGY CONSULT:     HPI:  22-year-old female with recurrent UTIs, hypothyroidism, on  was found to have left pyelonephritis & 1.4 cm possible left upper renal pole developing abscess. At the time she initially presented to the ED, she was complaining of abdominal pain and constipation for the past 4 days, located in the left side of the abdomen, moderate in intensity, continuous without any radiation, she has been having fevers, chills, and a mild suprapubic pain. She also had urinary frequency. She has a history of recurrent UTIs, latest one was in 2021 which she took antibiotics for. According to the patient, she approximately had around 8 UTIs in the past 3 years, 2 UTIs in past year. Additionally, she had a kidney infection when she was in [place] (she used to live there) in 2019 that required hospitalization and IV antibiotics. She was discharged today and presented back to the ED after having a fever of 103 F at home after being discharged on ciprofloxacin.  ID saw patient last admission recommended Rocephin 2g QD.    WBC 11.7, PMN 77%, improved from 13.9 & 82% respectively  Cr 0.7  lactate 1.4    s/p Ceftriaxone 2 g  s/p LR 1 L    In ED, VS: T 36.1, /69, , RR 18, SpO2 96% RA (15 Nov 2022 20:16)      PAST MEDICAL & SURGICAL HISTORY:  Hypothyroid      Anxiety      History of           MEDICATIONS  (STANDING):  buPROPion XL (24-Hour) . 150 milliGRAM(s) Oral daily  cefTRIAXone   IVPB 2000 milliGRAM(s) IV Intermittent every 24 hours  levothyroxine 25 MICROGram(s) Oral daily    MEDICATIONS  (PRN):  acetaminophen     Tablet .. 650 milliGRAM(s) Oral every 6 hours PRN Temp greater or equal to 38C (100.4F), Mild Pain (1 - 3)  aluminum hydroxide/magnesium hydroxide/simethicone Suspension 30 milliLiter(s) Oral every 4 hours PRN Dyspepsia  melatonin 3 milliGRAM(s) Oral at bedtime PRN Insomnia  ondansetron Injectable 4 milliGRAM(s) IV Push every 8 hours PRN Nausea and/or Vomiting      Allergies    No Known Allergies    Intolerances        Physical Exam:   Vital Signs Last 24 Hrs  T(C): 37.2 (2022 08:05), Max: 37.2 (2022 08:05)  T(F): 99 (2022 08:05), Max: 99 (2022 08:05)  HR: 88 (2022 08:05) (88 - 103)  BP: 106/60 (2022 08:05) (106/60 - 120/72)  BP(mean): --  RR: 18 (2022 08:05) (17 - 18)  SpO2: 99% (2022 08:05) (96% - 99%)    Parameters below as of 2022 08:05  Patient On (Oxygen Delivery Method): room air        Labs:                         11.5   10.10 )-----------( 251      ( 2022 06:53 )             33.7     11-    140  |  104  |  6<L>  ----------------------------<  89  3.9   |  26  |  0.6<L>    Ca    8.9      2022 06:53  Mg     2.0     11-16    TPro  6.2  /  Alb  3.4<L>  /  TBili  <0.2  /  DBili  x   /  AST  17  /  ALT  16  /  AlkPhos  74  11-16    PT/INR - ( 2022 06:53 )   PT: 12.20 sec;   INR: 1.07 ratio         PTT - ( 2022 06:53 )  PTT:27.6 sec    Pertinent labs:                      11.5   10.10 )-----------( 251      ( 2022 06:53 )             33.7       11-    140  |  104  |  6<L>  ----------------------------<  89  3.9   |  26  |  0.6<L>    Ca    8.9      2022 06:53  Mg     2.0     11-16    TPro  6.2  /  Alb  3.4<L>  /  TBili  <0.2  /  DBili  x   /  AST  17  /  ALT  16  /  AlkPhos  74  11-16      PT/INR - ( 2022 06:53 )   PT: 12.20 sec;   INR: 1.07 ratio         PTT - ( 2022 06:53 )  PTT:27.6 sec    Radiology imaging reviewed.     ASSESSMENT/PLAN:   22-year-old female with recurrent UTIs, hypothyroidism, on  was found to have left pyelonephritis & 1.4 cm possible left upper renal pole developing abscess. At the time she initially presented to the ED, she was complaining of abdominal pain and constipation for the past 4 days, located in the left side of the abdomen, moderate in intensity, continuous without any radiation, she has been having fevers, chills, and a mild suprapubic pain. She also had urinary frequency.   - Imaging and chart reviewed  - Hypodensity of the left upper renal pole possibly compatible with developing abscess measures 1.4 cm which is too small for IR guided drainage  - Downtrending to normal WBC count, afebrile, HDS  - Recommend continued antibiotic management  - If patient's clinical status changes significantly, recommend repeat CT abdomen and pelvis, but not within 48 hours, and re-consult IR     Thank you for the courtesy of this consult, please call [phone] between 8am and 5pm on weekdays, and [phone] at any other time with any further questions.   
  АНДРЕЙ COBB  22y, Female  Allergy: No Known Allergies      All historical available data reviewed.    HPI:  22-year-old female with recurrent UTIs, hypothyroidism, on  was found to have left pyelonephritis & 1.4 cm possible left upper renal pole developing abscess. At the time she initially presented to the ED, she was complaining of abdominal pain and constipation for the past 4 days, located in the left side of the abdomen, moderate in intensity, continuous without any radiation, she has been having fevers, chills, and a mild suprapubic pain. She also had urinary frequency. She has a history of recurrent UTIs, latest one was in 2021 which she took antibiotics for. According to the patient, she approximately had around 8 UTIs in the past 3 years, 2 UTIs in past year. Additionally, she had a kidney infection when she was in [place] (she used to live there) in  that required hospitalization and IV antibiotics. She was discharged today and presented back to the ED after having a fever of 103 F at home after being discharged on ciprofloxacin.  ID saw patient last admission recommended Rocephin 2g QD.    WBC 11.7, PMN 77%, improved from 13.9 & 82% respectively  Cr 0.7  lactate 1.4    s/p Ceftriaxone 2 g  s/p LR 1 L    In ED, VS: T 36.1, /69, , RR 18, SpO2 96% RA (15 Nov 2022 20:16)    FAMILY HISTORY:    PAST MEDICAL & SURGICAL HISTORY:  Hypothyroid      Anxiety      History of             VITALS:  T(F): 99, Max: 99 (22 @ 08:05)  HR: 88  BP: 106/60  RR: 18Vital Signs Last 24 Hrs  T(C): 37.2 (2022 08:05), Max: 37.2 (2022 08:05)  T(F): 99 (2022 08:05), Max: 99 (2022 08:05)  HR: 88 (2022 08:05) (88 - 103)  BP: 106/60 (2022 08:05) (106/60 - 120/72)  BP(mean): --  RR: 18 (2022 08:05) (17 - 18)  SpO2: 99% (2022 08:05) (96% - 99%)    Parameters below as of 2022 08:05  Patient On (Oxygen Delivery Method): room air        TESTS & MEASUREMENTS:                        11.5   10.10 )-----------( 251      ( 2022 06:53 )             33.7     11    140  |  104  |  6<L>  ----------------------------<  89  3.9   |  26  |  0.6<L>    Ca    8.9      2022 06:53  Mg     2.0         TPro  6.2  /  Alb  3.4<L>  /  TBili  <0.2  /  DBili  x   /  AST  17  /  ALT  16  /  AlkPhos  74      LIVER FUNCTIONS - ( 2022 06:53 )  Alb: 3.4 g/dL / Pro: 6.2 g/dL / ALK PHOS: 74 U/L / ALT: 16 U/L / AST: 17 U/L / GGT: x             Culture - Urine (collected 11-15-22 @ 06:40)  Source: Clean Catch Clean Catch (Midstream)  Final Report (22 @ 10:05):    No growth      Urinalysis Basic - ( 2022 14:43 )    Color: Yellow / Appearance: Slightly Turbid / S.014 / pH: x  Gluc: x / Ketone: Moderate  / Bili: Negative / Urobili: <2 mg/dL   Blood: x / Protein: 30 mg/dL / Nitrite: Negative   Leuk Esterase: Large / RBC: 6 /HPF /  /HPF   Sq Epi: x / Non Sq Epi: 3 /HPF / Bacteria: Moderate          RADIOLOGY & ADDITIONAL TESTS:  Personal review of radiological diagnostics performed  Echo and EKG results noted when applicable.     MEDICATIONS:  acetaminophen     Tablet .. 650 milliGRAM(s) Oral every 6 hours PRN  aluminum hydroxide/magnesium hydroxide/simethicone Suspension 30 milliLiter(s) Oral every 4 hours PRN  buPROPion XL (24-Hour) . 150 milliGRAM(s) Oral daily  cefTRIAXone   IVPB 2000 milliGRAM(s) IV Intermittent every 24 hours  levothyroxine 25 MICROGram(s) Oral daily  melatonin 3 milliGRAM(s) Oral at bedtime PRN  ondansetron Injectable 4 milliGRAM(s) IV Push every 8 hours PRN      ANTIBIOTICS:  cefTRIAXone   IVPB 2000 milliGRAM(s) IV Intermittent every 24 hours

## 2022-11-16 NOTE — CONSULT NOTE ADULT - CONSULT REASON
Pyelonephritis with possible developing abscess of the left kidney. Consulted for drainage
Pyelonephritis, Abscess

## 2022-11-16 NOTE — PATIENT PROFILE ADULT - FALL HARM RISK - UNIVERSAL INTERVENTIONS
Bed in lowest position, wheels locked, appropriate side rails in place/Call bell, personal items and telephone in reach/Instruct patient to call for assistance before getting out of bed or chair/Non-slip footwear when patient is out of bed/[place] to call system/Physically safe environment - no spills, clutter or unnecessary equipment/Purposeful Proactive Rounding/Room/bathroom lighting operational, light cord in reach

## 2022-11-16 NOTE — PROGRESS NOTE ADULT - ASSESSMENT
pyelo/UTI   - renal abscess?  - cultures pending  - may benefit from repeat imaging, possibly outpt?  - pt now assymptomatic for infection  - on rocephin

## 2022-11-16 NOTE — CONSULT NOTE ADULT - ASSESSMENT
22-year-old female with recurrent UTIs, hypothyroidism, on 11/14 was found to have left pyelonephritis & 1.4 cm possible left upper renal pole developing abscess. At the time she initially presented to the ED, she was complaining of abdominal pain and constipation for the past 4 days, located in the left side of the abdomen, moderate in intensity, continuous without any radiation, she has been having fevers, chills, and a mild suprapubic pain. She also had urinary frequency. She has a history of recurrent UTIs, latest one was in June 2021 which she took antibiotics for. According to the patient, she approximately had around 8 UTIs in the past 3 years, 2 UTIs in past year. Additionally, she had a kidney infection when she was in [place] (she used to live there) in 2019 that required hospitalization and IV antibiotics. She was discharged today and presented back to the ED after having a fever of 103 F at home after being discharged on ciprofloxacin.    IMPRESSION;  Sepsis secondary to acute left sided pyelonephritis with possible evolving abscess left upper renal pole  IVR : too small to drain for now  11/15 UCx NG    RECOMMENDATIONS;  repeat BCx/UCx  rocephin 2 gm iv q24h

## 2022-11-17 ENCOUNTER — TRANSCRIPTION ENCOUNTER (OUTPATIENT)
Age: 22
End: 2022-11-17

## 2022-11-17 VITALS
TEMPERATURE: 99 F | HEART RATE: 94 BPM | DIASTOLIC BLOOD PRESSURE: 72 MMHG | SYSTOLIC BLOOD PRESSURE: 149 MMHG | RESPIRATION RATE: 20 BRPM

## 2022-11-17 LAB
ALBUMIN SERPL ELPH-MCNC: 3.6 G/DL — SIGNIFICANT CHANGE UP (ref 3.5–5.2)
ALP SERPL-CCNC: 80 U/L — SIGNIFICANT CHANGE UP (ref 30–115)
ALT FLD-CCNC: 45 U/L — HIGH (ref 0–41)
ANION GAP SERPL CALC-SCNC: 12 MMOL/L — SIGNIFICANT CHANGE UP (ref 7–14)
AST SERPL-CCNC: 36 U/L — SIGNIFICANT CHANGE UP (ref 0–41)
BILIRUB SERPL-MCNC: <0.2 MG/DL — SIGNIFICANT CHANGE UP (ref 0.2–1.2)
BUN SERPL-MCNC: 7 MG/DL — LOW (ref 10–20)
CALCIUM SERPL-MCNC: 9.1 MG/DL — SIGNIFICANT CHANGE UP (ref 8.4–10.5)
CHLORIDE SERPL-SCNC: 104 MMOL/L — SIGNIFICANT CHANGE UP (ref 98–110)
CO2 SERPL-SCNC: 25 MMOL/L — SIGNIFICANT CHANGE UP (ref 17–32)
CREAT SERPL-MCNC: 0.6 MG/DL — LOW (ref 0.7–1.5)
CULTURE RESULTS: NO GROWTH — SIGNIFICANT CHANGE UP
EGFR: 128 ML/MIN/1.73M2 — SIGNIFICANT CHANGE UP
GLUCOSE SERPL-MCNC: 86 MG/DL — SIGNIFICANT CHANGE UP (ref 70–99)
HCT VFR BLD CALC: 36.3 % — LOW (ref 37–47)
HGB BLD-MCNC: 11.8 G/DL — LOW (ref 12–16)
MAGNESIUM SERPL-MCNC: 2.2 MG/DL — SIGNIFICANT CHANGE UP (ref 1.8–2.4)
MCHC RBC-ENTMCNC: 28.5 PG — SIGNIFICANT CHANGE UP (ref 27–31)
MCHC RBC-ENTMCNC: 32.5 G/DL — SIGNIFICANT CHANGE UP (ref 32–37)
MCV RBC AUTO: 87.7 FL — SIGNIFICANT CHANGE UP (ref 81–99)
NRBC # BLD: 0 /100 WBCS — SIGNIFICANT CHANGE UP (ref 0–0)
PLATELET # BLD AUTO: 302 K/UL — SIGNIFICANT CHANGE UP (ref 130–400)
POTASSIUM SERPL-MCNC: 4 MMOL/L — SIGNIFICANT CHANGE UP (ref 3.5–5)
POTASSIUM SERPL-SCNC: 4 MMOL/L — SIGNIFICANT CHANGE UP (ref 3.5–5)
PROT SERPL-MCNC: 6.5 G/DL — SIGNIFICANT CHANGE UP (ref 6–8)
RBC # BLD: 4.14 M/UL — LOW (ref 4.2–5.4)
RBC # FLD: 13.1 % — SIGNIFICANT CHANGE UP (ref 11.5–14.5)
SODIUM SERPL-SCNC: 141 MMOL/L — SIGNIFICANT CHANGE UP (ref 135–146)
SPECIMEN SOURCE: SIGNIFICANT CHANGE UP
WBC # BLD: 7.5 K/UL — SIGNIFICANT CHANGE UP (ref 4.8–10.8)
WBC # FLD AUTO: 7.5 K/UL — SIGNIFICANT CHANGE UP (ref 4.8–10.8)

## 2022-11-17 PROCEDURE — 76937 US GUIDE VASCULAR ACCESS: CPT | Mod: 26

## 2022-11-17 PROCEDURE — 36000 PLACE NEEDLE IN VEIN: CPT

## 2022-11-17 PROCEDURE — 99239 HOSP IP/OBS DSCHRG MGMT >30: CPT

## 2022-11-17 RX ORDER — BUPROPION HYDROCHLORIDE 150 MG/1
1 TABLET, EXTENDED RELEASE ORAL
Qty: 0 | Refills: 0 | DISCHARGE

## 2022-11-17 RX ORDER — CEFTRIAXONE 500 MG/1
2000 INJECTION, POWDER, FOR SOLUTION INTRAMUSCULAR; INTRAVENOUS EVERY 24 HOURS
Refills: 0 | Status: DISCONTINUED | OUTPATIENT
Start: 2022-11-17 | End: 2022-11-17

## 2022-11-17 RX ORDER — LEVOTHYROXINE SODIUM 125 MCG
1 TABLET ORAL
Qty: 0 | Refills: 0 | DISCHARGE

## 2022-11-17 RX ORDER — LEVOTHYROXINE SODIUM 125 MCG
0 TABLET ORAL
Qty: 0 | Refills: 0 | DISCHARGE

## 2022-11-17 RX ORDER — BUPROPION HYDROCHLORIDE 150 MG/1
0 TABLET, EXTENDED RELEASE ORAL
Qty: 0 | Refills: 0 | DISCHARGE

## 2022-11-17 RX ADMIN — CEFTRIAXONE 100 MILLIGRAM(S): 500 INJECTION, POWDER, FOR SOLUTION INTRAMUSCULAR; INTRAVENOUS at 17:04

## 2022-11-17 RX ADMIN — BUPROPION HYDROCHLORIDE 150 MILLIGRAM(S): 150 TABLET, EXTENDED RELEASE ORAL at 12:57

## 2022-11-17 RX ADMIN — Medication 25 MICROGRAM(S): at 05:34

## 2022-11-17 NOTE — DISCHARGE NOTE PROVIDER - NSDCCAREPROVSEEN_GEN_ALL_CORE_FT
[facility] Internal Medicine  [facility] Infectious Disease  [facility] Interventional Radiology

## 2022-11-17 NOTE — DISCHARGE NOTE PROVIDER - PROVIDER RX CONTACT NUMBER
Chief complaint   Chief Complaint   Patient presents with   • Weakness - Generalized     c/o cough dizziness sore throat       History of Present Illness: Patient is a 19 y.o. female who presents with IUP at 33w2d weeks gestation. G 1, P 0. Tachycardia. + Flu.       Past Medical History:   Diagnosis Date   • History of bronchitis    • History of ear infections      Blood Type: B     Past Surgical History:   Procedure Laterality Date   • ADENOIDECTOMY     • EAR TUBES       Family History   Problem Relation Age of Onset   • No Known Problems Mother    • Hypertension Father    • Diabetes Maternal Aunt    • Diabetes Paternal Aunt    • Cancer Paternal Grandmother      Social History     Tobacco Use   • Smoking status: Former Smoker   • Smokeless tobacco: Never Used   Vaping Use   • Vaping Use: Every day   • Last attempt to quit: 12/12/2021   • Substances: Nicotine, Flavoring   • Devices: Disposable, Pre-filled or refillable cartridge   Substance Use Topics   • Alcohol use: No   • Drug use: No     Medications Prior to Admission   Medication Sig Dispense Refill Last Dose   • Prenatal Vit-Fe Fumarate-FA (prenatal vitamin 27-0.8) 27-0.8 MG tablet tablet Take  by mouth Daily.   12/17/2021 at 0900     Allergies:  Patient has no known allergies.      Vital Signs  Temp:  [98 °F (36.7 °C)-98.7 °F (37.1 °C)] 98 °F (36.7 °C)  Heart Rate:  [] 99  Resp:  [18-20] 20  BP: (111-140)/(45-72) 122/64    Radiology  Imaging Results (Last 24 Hours)     ** No results found for the last 24 hours. **          Labs  Lab Results (last 24 hours)     Procedure Component Value Units Date/Time    Ferritin [[id number]]  (Normal) Collected: 12/18/21 0519    Specimen: Blood Updated: 12/18/21 0628     Ferritin 51.13 ng/mL     Narrative:      Results may be falsely decreased if patient taking Biotin.      TSH [[id number]]  (Normal) Collected: 12/18/21 0519    Specimen: Blood Updated: 12/18/21 0628     TSH 1.950 uIU/mL     T4, Free [[id number]]   (Abnormal) Collected: 12/18/21 0519    Specimen: Blood Updated: 12/18/21 0628     Free T4 0.84 ng/dL     Narrative:      Results may be falsely increased if patient taking Biotin.      Comprehensive Metabolic Panel [[id number]]  (Abnormal) Collected: 12/18/21 0519    Specimen: Blood Updated: 12/18/21 0624     Glucose 92 mg/dL      BUN 4 mg/dL      Creatinine 0.58 mg/dL      Sodium 139 mmol/L      Potassium 3.6 mmol/L      Chloride 105 mmol/L      CO2 20.9 mmol/L      Calcium 8.5 mg/dL      Total Protein 5.3 g/dL      Albumin 2.83 g/dL      ALT (SGPT) 7 U/L      AST (SGOT) 8 U/L      Alkaline Phosphatase 109 U/L      Total Bilirubin 0.2 mg/dL      eGFR Non African Amer 131 mL/min/1.73      Globulin 2.5 gm/dL      A/G Ratio 1.1 g/dL      BUN/Creatinine Ratio 6.9     Anion Gap 13.1 mmol/L     Narrative:      GFR Normal >60  Chronic Kidney Disease <60  Kidney Failure <15      Iron Profile [[id number]]  (Abnormal) Collected: 12/18/21 0519    Specimen: Blood Updated: 12/18/21 0624     Iron 41 mcg/dL      Iron Saturation 9 %      Transferrin 301 mg/dL      TIBC 448 mcg/dL     Magnesium [[id number]]  (Normal) Collected: 12/18/21 0519    Specimen: Blood Updated: 12/18/21 0624     Magnesium 1.6 mg/dL     C-reactive Protein [[id number]]  (Abnormal) Collected: 12/18/21 0519    Specimen: Blood Updated: 12/18/21 0624     C-Reactive Protein 2.24 mg/dL     Lactic Acid, Plasma [[id number]]  (Normal) Collected: 12/18/21 0519    Specimen: Blood Updated: 12/18/21 0620     Lactate 0.6 mmol/L     Hemoglobin A1c [[id number]]  (Normal) Collected: 12/18/21 0519    Specimen: Blood Updated: 12/18/21 0615     Hemoglobin A1C 5.30 %     Narrative:      Hemoglobin A1C Ranges:    Increased Risk for Diabetes  5.7% to 6.4%  Diabetes                     >= 6.5%  Diabetic Goal                < 7.0%    CBC & Differential [[id number]]  (Abnormal) Collected: 12/18/21 0519    Specimen: Blood Updated: 12/18/21 0559    Narrative:      The following orders were  created for panel order CBC & Differential.  Procedure                               Abnormality         Status                     ---------                               -----------         ------                     CBC Auto Differential[[id number]]        Abnormal            Final result                 Please view results for these tests on the individual orders.    CBC Auto Differential [[id number]]  (Abnormal) Collected: 12/18/21 0519    Specimen: Blood Updated: 12/18/21 0559     WBC 5.65 10*3/mm3      RBC 3.53 10*6/mm3      Hemoglobin 10.2 g/dL      Hematocrit 31.4 %      MCV 89.0 fL      MCH 28.9 pg      MCHC 32.5 g/dL      RDW 14.4 %      RDW-SD 46.2 fl      MPV 9.5 fL      Platelets 207 10*3/mm3      Neutrophil % 60.9 %      Lymphocyte % 21.2 %      Monocyte % 17.0 %      Eosinophil % 0.0 %      Basophil % 0.2 %      Immature Grans % 0.7 %      Neutrophils, Absolute 3.44 10*3/mm3      Lymphocytes, Absolute 1.20 10*3/mm3      Monocytes, Absolute 0.96 10*3/mm3      Eosinophils, Absolute 0.00 10*3/mm3      Basophils, Absolute 0.01 10*3/mm3      Immature Grans, Absolute 0.04 10*3/mm3      nRBC 0.0 /100 WBC     Procalcitonin [[id number]] Collected: 12/18/21 0519    Specimen: Blood Updated: 12/18/21 0554    Vitamin B12 [[id number]] Collected: 12/18/21 0519    Specimen: Blood Updated: 12/18/21 0554    Folate [[id number]] Collected: 12/18/21 0519    Specimen: Blood Updated: 12/18/21 0554    Urine Drug Screen - Urine, Clean Catch [[id number]]  (Normal) Collected: 12/17/21 2303    Specimen: Urine, Clean Catch Updated: 12/18/21 0022     THC, Screen, Urine Negative     Phencyclidine (PCP), Urine Negative     Cocaine Screen, Urine Negative     Methamphetamine, Ur Negative     Opiate Screen Negative     Amphetamine Screen, Urine Negative     Benzodiazepine Screen, Urine Negative     Tricyclic Antidepressants Screen Negative     Methadone Screen, Urine Negative     Barbiturates Screen, Urine Negative     Oxycodone Screen,  Urine Negative     Propoxyphene Screen Negative     Buprenorphine, Screen, Urine Negative    Narrative:      Cutoff For Drugs Screened:    Amphetamines               500 ng/ml  Barbiturates               200 ng/ml  Benzodiazepines            150 ng/ml  Cocaine                    150 ng/ml  Methadone                  200 ng/ml  Opiates                    100 ng/ml  Phencyclidine               25 ng/ml  THC                            50 ng/ml  Methamphetamine            500 ng/ml  Tricyclic Antidepressants  300 ng/ml  Oxycodone                  100 ng/ml  Propoxyphene               300 ng/ml  Buprenorphine               10 ng/ml    The normal value for all drugs tested is negative. This report includes unconfirmed screening results, with the cutoff values listed, to be used for medical treatment purposes only.  Unconfirmed results must not be used for non-medical purposes such as employment or legal testing.  Clinical consideration should be applied to any drug of abuse test, particularly when unconfirmed results are used.      Urine Culture - Urine, Urine, Clean Catch [[id number]] Collected: 12/17/21 2303    Specimen: Urine, Clean Catch Updated: 12/17/21 2354    Urinalysis With Microscopic If Indicated (No Culture) - Urine, Clean Catch [[id number]]  (Abnormal) Collected: 12/17/21 2303    Specimen: Urine, Clean Catch Updated: 12/17/21 2334     Color, UA Yellow     Appearance, UA Cloudy     pH, UA 7.0     Specific Gravity, UA 1.012     Glucose,  mg/dL (1+)     Ketones, UA 15 mg/dL (1+)     Bilirubin, UA Negative     Blood, UA Negative     Protein, UA Negative     Leuk Esterase, UA Negative     Nitrite, UA Negative     Urobilinogen, UA 0.2 E.U./dL    Narrative:      Urine microscopic not indicated.    COVID-19 and FLU A/B PCR - Swab, Nasopharynx [[id number]]  (Abnormal) Collected: 12/17/21 1647    Specimen: Swab from Nasopharynx Updated: 12/17/21 1821     COVID19 Not Detected     Influenza A PCR Detected      Influenza B PCR Not Detected    Narrative:      Fact sheet for providers: https://www.fda.gov/media/565152/download    Fact sheet for patients: https://www.fda.gov/media/649237/download    Test performed by PCR.    COVID PRE-OP / PRE-PROCEDURE SCREENING ORDER (NO ISOLATION) - Swab, Nasopharynx [[id number]]  (Normal) Collected: 12/17/21 1647    Specimen: Swab from Nasopharynx Updated: 12/17/21 1727    Narrative:      The following orders were created for panel order COVID PRE-OP / PRE-PROCEDURE SCREENING ORDER (NO ISOLATION) - Swab, Nasopharynx.  Procedure                               Abnormality         Status                     ---------                               -----------         ------                     COVID-19,CEPHEID/WENDI,CO...[[id number]]  Normal              Final result                 Please view results for these tests on the individual orders.    COVID-19,CEPHEID/WENDI,COR/[facility]/PAD/[facility] IN-HOUSE(OR EMERGENT/ADD-ON),NP SWAB IN TRANSPORT MEDIA 3-4 HR TAT, RT-PCR - Swab, Nasopharynx [[id number]]  (Normal) Collected: 12/17/21 1647    Specimen: Swab from Nasopharynx Updated: 12/17/21 1727     COVID19 Not Detected    Narrative:      Fact sheet for providers: https://www.fda.gov/media/516265/download     Fact sheet for patients: https://www.fda.gov/media/277703/download  Fact sheet for providers: https://www.fda.gov/media/440811/download    Fact sheet for patients: https://www.fda.gov/media/718101/download    Test performed by PCR.    Comprehensive Metabolic Panel [[id number]]  (Abnormal) Collected: 12/17/21 1636    Specimen: Blood Updated: 12/17/21 1715     Glucose 107 mg/dL      BUN 5 mg/dL      Creatinine 0.54 mg/dL      Sodium 137 mmol/L      Potassium 3.6 mmol/L      Chloride 103 mmol/L      CO2 20.0 mmol/L      Calcium 8.5 mg/dL      Total Protein 5.6 g/dL      Albumin 2.88 g/dL      ALT (SGPT) 6 U/L      AST (SGOT) 10 U/L      Alkaline Phosphatase 114 U/L      Total Bilirubin 0.2 mg/dL      eGFR Non   Amer 143 mL/min/1.73      Globulin 2.7 gm/dL      A/G Ratio 1.1 g/dL      BUN/Creatinine Ratio 9.3     Anion Gap 14.0 mmol/L     Narrative:      GFR Normal >60  Chronic Kidney Disease <60  Kidney Failure <15      Rapid Strep A Screen - Swab, Throat [[id number]]  (Normal) Collected: 12/17/21 1647    Specimen: Swab from Throat Updated: 12/17/21 1710     Strep A Ag Negative    Beta Strep Culture, Throat - Swab, Throat [[id number]] Collected: 12/17/21 1647    Specimen: Swab from Throat Updated: 12/17/21 1710    CBC & Differential [[id number]]  (Abnormal) Collected: 12/17/21 1636    Specimen: Blood Updated: 12/17/21 1650    Narrative:      The following orders were created for panel order CBC & Differential.  Procedure                               Abnormality         Status                     ---------                               -----------         ------                     CBC Auto Differential[[id number]]        Abnormal            Final result                 Please view results for these tests on the individual orders.    CBC Auto Differential [[id number]]  (Abnormal) Collected: 12/17/21 1636    Specimen: Blood Updated: 12/17/21 1650     WBC 5.68 10*3/mm3      RBC 3.69 10*6/mm3      Hemoglobin 10.6 g/dL      Hematocrit 32.9 %      MCV 89.2 fL      MCH 28.7 pg      MCHC 32.2 g/dL      RDW 14.0 %      RDW-SD 45.1 fl      MPV 9.2 fL      Platelets 199 10*3/mm3      Neutrophil % 64.8 %      Lymphocyte % 16.7 %      Monocyte % 17.6 %      Eosinophil % 0.0 %      Basophil % 0.2 %      Immature Grans % 0.7 %      Neutrophils, Absolute 3.68 10*3/mm3      Lymphocytes, Absolute 0.95 10*3/mm3      Monocytes, Absolute 1.00 10*3/mm3      Eosinophils, Absolute 0.00 10*3/mm3      Basophils, Absolute 0.01 10*3/mm3      Immature Grans, Absolute 0.04 10*3/mm3      nRBC 0.0 /100 WBC             Review of Systems    The following systems were reviewed and negative;  ENT, respiratory, cardiovascular, gastrointestinal,  genitourinary, breast, endocrine and allergies / immunologic.      Physical Exam:      General Appearance:    Alert, cooperative, in no acute distress   Head:    Normocephalic, without obvious abnormality, atraumatic   Eyes:            Lids and lashes normal, conjunctivae and sclerae normal, no   icterus, no pallor, corneas clear, PERRLA   Ears:    Ears appear intact with no abnormalities noted   Throat:   No oral lesions, no thrush, oral mucosa moist   Neck:   No adenopathy, supple, trachea midline, no thyromegaly, no     carotid bruit, no JVD   Back:     No kyphosis present, no scoliosis present, no skin lesions,       erythema or scars, no tenderness to percussion or                   palpation,   range of motion normal   Lungs:     Clear to auscultation,respirations regular, even and                   unlabored    Heart:    Regular rhythm and normal rate, normal S1 and S2, no            murmur, no gallop, no rub, no click   Breast Exam:    Deferred   Abdomen:     Normal bowel sounds, no masses, no organomegaly, soft        non-tender, non-distended, no guarding, no rebound                 tenderness   Genitalia:    Deferred   Extremities:   Moves all extremities well, no edema, no cyanosis, no              redness   Pulses:   Pulses palpable and equal bilaterally   Skin:   No bleeding, bruising or rash   Lymph nodes:   No palpable adenopathy   Neurologic:   Cranial nerves 2 - 12 grossly intact, sensation intact, DTR        present and equal bilaterally         Assessment: Patient is a 19 y.o. female who presents with IUP at 33w2d weeks gestation. G 1, P 0.   Chief Complaint   Patient presents with   • Weakness - Generalized     c/o cough dizziness sore throat       Plan of Care: Admit. Proceed with cardiology consult. Echo. Hospitalist consult.       Markos Mathew III, MD  12/18/21  08:19 EST     [phone]

## 2022-11-17 NOTE — PROGRESS NOTE ADULT - ASSESSMENT
· Assessment	  22-year-old female with recurrent UTIs, hypothyroidism, on 11/14 was found to have left pyelonephritis & 1.4 cm possible left upper renal pole developing abscess. At the time she initially presented to the ED, she was complaining of abdominal pain and constipation for the past 4 days, located in the left side of the abdomen, moderate in intensity, continuous without any radiation, she has been having fevers, chills, and a mild suprapubic pain. She also had urinary frequency. She has a history of recurrent UTIs, latest one was in June 2021 which she took antibiotics for. According to the patient, she approximately had around 8 UTIs in the past 3 years, 2 UTIs in past year. Additionally, she had a kidney infection when she was in [place] (she used to live there) in 2019 that required hospitalization and IV antibiotics. She was discharged today and presented back to the ED after having a fever of 103 F at home after being discharged on ciprofloxacin.    IMPRESSION;  Resolved sepsis secondary to acute left sided pyelonephritis with possible evolving abscess left upper renal pole  IVR : too small to drain for now  11/15 UCx NG  11/15 BCx NGTD    RECOMMENDATIONS;  Midline  Rocephin 2 gm iv q24h for 14 days starting 11/16 and then possibly po vantin 200 mg q12h for 2 more weeks  Will need a repeat CT before stopping ABx to evaluate for resolution of the left renal abscess  f/u with Dr Courtney [id number] at [address] [facility] on tuesday via telehealth . Pt will be called  between 10-12.30 am.  [address] [facility]  [phone]  Fax [phone]  Please do not hesitate to recall ID if any questions arise either through [employer] or through [employer]

## 2022-11-17 NOTE — DISCHARGE NOTE NURSING/CASE MANAGEMENT/SOCIAL WORK - PATIENT PORTAL LINK FT
You can access the FollowMyHealth Patient Portal offered by [facility] by registering at the following website: [email or web address]. By joining [employer]’s FollowMyHealth portal, you will also be able to view your health information using other applications (apps) compatible with our system.

## 2022-11-17 NOTE — PROGRESS NOTE ADULT - SUBJECTIVE AND OBJECTIVE BOX
Progress Note:  Provider Speciality                            Hospitalist      АНДРЕЙ COBB [id number] 22y Female     CHIEF PRESENTING COMPLAINT:  Patient is a 22y old  Female who presents with a chief complaint of Pyelonephritis, Abscess (2022 08:23)        SUBJECTIVE:  Patient was seen and examined at bedside. Reports improvement in  presenting complaint.   No significant overnight events reported.     HISTORY OF PRESENTING ILLNESS:  HPI:  22-year-old female with recurrent UTIs, hypothyroidism, on  was found to have left pyelonephritis & 1.4 cm possible left upper renal pole developing abscess. At the time she initially presented to the ED, she was complaining of abdominal pain and constipation for the past 4 days, located in the left side of the abdomen, moderate in intensity, continuous without any radiation, she has been having fevers, chills, and a mild suprapubic pain. She also had urinary frequency. She has a history of recurrent UTIs, latest one was in 2021 which she took antibiotics for. According to the patient, she approximately had around 8 UTIs in the past 3 years, 2 UTIs in past year. Additionally, she had a kidney infection when she was in [place] (she used to live there) in 2019 that required hospitalization and IV antibiotics. She was discharged today and presented back to the ED after having a fever of 103 F at home after being discharged on ciprofloxacin.  ID saw patient last admission recommended Rocephin 2g QD.    WBC 11.7, PMN 77%, improved from 13.9 & 82% respectively  Cr 0.7  lactate 1.4    s/p Ceftriaxone 2 g  s/p LR 1 L    In ED, VS: T 36.1, /69, , RR 18, SpO2 96% RA (15 Nov 2022 20:16)        REVIEW OF SYSTEMS:  Patient denies  headache, fever, chills. Denies chest pain, shortness of breath, palpitation. Denies nausea, vomiting, abdominal pain or diarrhoea, Denies dysuria.   At least 10 systems were reviewed in ROS. All systems reviewed  are within normal limits except for the complaints as described in Subjective.    PAST MEDICAL & SURGICAL HISTORY:  PAST MEDICAL & SURGICAL HISTORY:  Hypothyroid      Anxiety      History of               VITAL SIGNS:  Vital Signs Last 24 Hrs  T(C): 35.9 (2022 04:54), Max: 36.8 (2022 21:15)  T(F): 96.7 (2022 04:54), Max: 98.2 (2022 21:15)  HR: 79 (2022 04:54) (76 - 80)  BP: 113/78 (2022 04:54) (106/60 - 113/78)  BP(mean): --  RR: 20 (2022 04:54) (18 - 20)  SpO2: --              PHYSICAL EXAMINATION:  Not in acute distress  General: No icterus  HEENT:   no JVD.  Heart: S1+S2 audible  Lungs: bilateral  moderate air entry, no wheezing, no crepitations.  Abdomen: Soft, non-tender, non-distended , no  rigidity or guarding.  CNS: Awake alert, CN  grossly intact.  Extremities:  No edema            CONSULTS:  Consultant(s) Notes Reviewed by me.   Care Discussed with Consultants/Other Providers where required.        MEDICATIONS:  MEDICATIONS  (STANDING):  buPROPion XL (24-Hour) . 150 milliGRAM(s) Oral daily  cefTRIAXone   IVPB 2000 milliGRAM(s) IV Intermittent every 24 hours  influenza   Vaccine 0.5 milliLiter(s) IntraMuscular once  levothyroxine 25 MICROGram(s) Oral daily    MEDICATIONS  (PRN):  acetaminophen     Tablet .. 650 milliGRAM(s) Oral every 6 hours PRN Temp greater or equal to 38C (100.4F), Mild Pain (1 - 3)  aluminum hydroxide/magnesium hydroxide/simethicone Suspension 30 milliLiter(s) Oral every 4 hours PRN Dyspepsia  melatonin 3 milliGRAM(s) Oral at bedtime PRN Insomnia  ondansetron Injectable 4 milliGRAM(s) IV Push every 8 hours PRN Nausea and/or Vomiting            ASSESSMENT:  Acute pyelonephritis with possible renal abscess  Abscess too small for IR guided drainage  Sepsis present on admission due to Pyelo  Hypothyroidism  Anxiety disorder        Sepsis resolved  11/15 UCx NG, 11/15 BCx NGTD  Midline today  ID noted: Rocephin 2 gm iv q24h for 14 days starting  and then possibly po Vantin 200 mg q12h for 2 more weeks. Will need a repeat CT before stopping ABx to evaluate for resolution of the left renal abscess  Continue home meds including  levothyroxine & bupropion      Total time spent to complete patient's bedside assessment, physical examination, review medical chart including labs & imaging, discuss medical plan of care with housestaff was more than 35 minutes        
Pt seen and exmained. Denies any back pain. No chills or fevers    T(F): , Max: 99 (11-16-22 @ 08:05)  HR: 76 (11-16-22 @ 21:15) (76 - 92)  BP: 110/65 (11-16-22 @ 21:15)  RR: 20 (11-16-22 @ 21:15)  SpO2: 99% (11-16-22 @ 08:05)  General: No apparent distress  Cardiovascular: S1, S2  Gastrointestinal: Soft, Non-tender, Non-distended  Respiratory: Good air entry bilaterally  Musculoskeletal: Moves all extremities  Lymphatic: No edema  Neurologic: No gross motor deficit  Dermatologic: Skin dry  PT/INR - ( 16 Nov 2022 06:53 )   PT: 12.20 sec;   INR: 1.07 ratio         PTT - ( 16 Nov 2022 06:53 )  PTT:27.6 sec                        11.5   10.10 )-----------( 251      ( 16 Nov 2022 06:53 )             33.7     11-16    140  |  104  |  6<L>  ----------------------------<  89  3.9   |  26  |  0.6<L>    Ca    8.9      16 Nov 2022 06:53  Mg     2.0     11-16    TPro  6.2  /  Alb  3.4<L>  /  TBili  <0.2  /  DBili  x   /  AST  17  /  ALT  16  /  AlkPhos  74  11-16      Culture - Urine (collected 15 Nov 2022 06:40)  Source: Clean Catch Clean Catch (Midstream)  Final Report (16 Nov 2022 10:05):    No growth    Culture - Blood (collected 15 Nov 2022 04:46)  Source: .Blood None  Preliminary Report (16 Nov 2022 14:02):    No growth to date.    Culture - Blood (collected 15 Nov 2022 04:46)  Source: .Blood None  Preliminary Report (16 Nov 2022 14:02):    No growth to date.    
SUBJECTIVE:  HPI:  22-year-old female with recurrent UTIs, hypothyroidism, on  was found to have left pyelonephritis & 1.4 cm possible left upper renal pole developing abscess. At the time she initially presented to the ED, she was complaining of abdominal pain and constipation for the past 4 days, located in the left side of the abdomen, moderate in intensity, continuous without any radiation, she has been having fevers, chills, and a mild suprapubic pain. She also had urinary frequency. She has a history of recurrent UTIs, latest one was in 2021 which she took antibiotics for. According to the patient, she approximately had around 8 UTIs in the past 3 years, 2 UTIs in past year. Additionally, she had a kidney infection when she was in [place] (she used to live there) in 2019 that required hospitalization and IV antibiotics. She was discharged today and presented back to the ED after having a fever of 103 F at home after being discharged on ciprofloxacin.  ID saw patient last admission recommended Rocephin 2g QD.    WBC 11.7, PMN 77%, improved from 13.9 & 82% respectively  Cr 0.7  lactate 1.4    s/p Ceftriaxone 2 g  s/p LR 1 L    In ED, VS: T 36.1, /69, , RR 18, SpO2 96% RA (15 Nov 2022 20:16)      Patient is a 22y old Female who presents with a chief complaint of Pyelonephritis, Abscess (2022 22:07)    Currently admitted to medicine with the primary diagnosis of Pyelonephritis       Today is hospital day 2d.     PAST MEDICAL & SURGICAL HISTORY  Hypothyroid    Anxiety    History of         ALLERGIES:  No Known Allergies    MEDICATIONS:  ACTIVE MEDICATIONS  acetaminophen     Tablet .. 650 milliGRAM(s) Oral every 6 hours PRN  aluminum hydroxide/magnesium hydroxide/simethicone Suspension 30 milliLiter(s) Oral every 4 hours PRN  buPROPion XL (24-Hour) . 150 milliGRAM(s) Oral daily  cefTRIAXone   IVPB 2000 milliGRAM(s) IV Intermittent every 24 hours  influenza   Vaccine 0.5 milliLiter(s) IntraMuscular once  levothyroxine 25 MICROGram(s) Oral daily  melatonin 3 milliGRAM(s) Oral at bedtime PRN  ondansetron Injectable 4 milliGRAM(s) IV Push every 8 hours PRN      VITALS:   T(F): 96.7  HR: 79  BP: 113/78  RR: 20  SpO2: 99%    LABS:                        11.5   10.10 )-----------( 251      ( 2022 06:53 )             33.7     11-    140  |  104  |  6<L>  ----------------------------<  89  3.9   |  26  |  0.6<L>    Ca    8.9      2022 06:53  Mg     2.0     11-    TPro  6.2  /  Alb  3.4<L>  /  TBili  <0.2  /  DBili  x   /  AST  17  /  ALT  16  /  AlkPhos  74  11-16    PT/INR - ( 2022 06:53 )   PT: 12.20 sec;   INR: 1.07 ratio         PTT - ( 2022 06:53 )  PTT:27.6 sec  Urinalysis Basic - ( 2022 18:13 )    Color: Light Yellow / Appearance: Clear / S.005 / pH: x  Gluc: x / Ketone: Negative  / Bili: Negative / Urobili: <2 mg/dL   Blood: x / Protein: Negative / Nitrite: Negative   Leuk Esterase: Negative / RBC: x / WBC x   Sq Epi: x / Non Sq Epi: x / Bacteria: x            Culture - Blood (collected 15 Nov 2022 18:38)  Source: .Blood Blood-Peripheral  Preliminary Report (2022 02:02):    No growth to date.    Culture - Blood (collected 15 Nov 2022 18:38)  Source: .Blood Blood-Peripheral  Preliminary Report (2022 02:01):    No growth to date.    Culture - Urine (collected 15 Nov 2022 06:40)  Source: Clean Catch Clean Catch (Midstream)  Final Report (2022 10:05):    No growth    Culture - Blood (collected 15 Nov 2022 04:46)  Source: .Blood None  Preliminary Report (2022 14:02):    No growth to date.    Culture - Blood (collected 15 Nov 2022 04:46)  Source: .Blood None  Preliminary Report (2022 14:02):    No growth to date.      CARDIAC MARKERS ( 15 Nov 2022 18:38 )  x     / <0.01 ng/mL / x     / x     / x              PHYSICAL EXAM:  GEN: No acute distress  LUNGS: Clear to auscultation bilaterally   HEART: S1/S2 present.    ABD: Soft, non-tender, non-distended.   EXT: No pedal edema, warm to touch, no discoloration  NEURO: AAOX3          
  АНДРЕЙ COBB  22y, Female    All available historical data reviewed    OVERNIGHT EVENTS:  feels well and has no new complaints  No fevers     ROS:  General: Denies rigors, nightsweats  HEENT: Denies headache, rhinorrhea, sore throat, eye pain  CV: Denies CP, palpitations  PULM: Denies wheezing, hemoptysis  GI: Denies hematemesis, hematochezia, melena  : Denies discharge, hematuria  MSK: Denies arthralgias, myalgias  SKIN: Denies rash, lesions  NEURO: Denies paresthesias, weakness  PSYCH: Denies depression, anxiety    VITALS:  T(F): 96.7, Max: 98.2 (- @ 21:15)  HR: 79  BP: 113/78  RR: 20Vital Signs Last 24 Hrs  T(C): 35.9 (2022 04:54), Max: 36.8 (2022 21:15)  T(F): 96.7 (2022 04:54), Max: 98.2 (2022 21:15)  HR: 79 (2022 04:54) (76 - 80)  BP: 113/78 (2022 04:54) (106/60 - 113/78)  BP(mean): --  RR: 20 (2022 04:54) (18 - 20)  SpO2: --        TESTS & MEASUREMENTS:                        11.8   7.50  )-----------( 302      ( 2022 07:16 )             36.3         140  |  104  |  6<L>  ----------------------------<  89  3.9   |  26  |  0.6<L>    Ca    8.9      2022 06:53  Mg     2.0         TPro  6.2  /  Alb  3.4<L>  /  TBili  <0.2  /  DBili  x   /  AST  17  /  ALT  16  /  AlkPhos  74  11-    LIVER FUNCTIONS - ( 2022 06:53 )  Alb: 3.4 g/dL / Pro: 6.2 g/dL / ALK PHOS: 74 U/L / ALT: 16 U/L / AST: 17 U/L / GGT: x             Culture - Blood (collected 11-15-22 @ 18:38)  Source: .Blood Blood-Peripheral  Preliminary Report (22 @ 02:02):    No growth to date.    Culture - Blood (collected 11-15-22 @ 18:38)  Source: .Blood Blood-Peripheral  Preliminary Report (22 @ 02:01):    No growth to date.    Culture - Urine (collected 11-15-22 @ 06:40)  Source: Clean Catch Clean Catch (Midstream)  Final Report (22 @ 10:05):    No growth    Culture - Blood (collected 11-15-22 @ 04:46)  Source: .Blood None  Preliminary Report (22 @ 14:02):    No growth to date.    Culture - Blood (collected 11-15-22 @ 04:46)  Source: .Blood None  Preliminary Report (22 @ 14:02):    No growth to date.      Urinalysis Basic - ( 2022 18:13 )    Color: Light Yellow / Appearance: Clear / S.005 / pH: x  Gluc: x / Ketone: Negative  / Bili: Negative / Urobili: <2 mg/dL   Blood: x / Protein: Negative / Nitrite: Negative   Leuk Esterase: Negative / RBC: x / WBC x   Sq Epi: x / Non Sq Epi: x / Bacteria: x          RADIOLOGY & ADDITIONAL TESTS:  Personal review of radiological diagnostics performed  Echo and EKG results noted when applicable.     MEDICATIONS:  acetaminophen     Tablet .. 650 milliGRAM(s) Oral every 6 hours PRN  aluminum hydroxide/magnesium hydroxide/simethicone Suspension 30 milliLiter(s) Oral every 4 hours PRN  buPROPion XL (24-Hour) . 150 milliGRAM(s) Oral daily  cefTRIAXone   IVPB 2000 milliGRAM(s) IV Intermittent every 24 hours  influenza   Vaccine 0.5 milliLiter(s) IntraMuscular once  levothyroxine 25 MICROGram(s) Oral daily  melatonin 3 milliGRAM(s) Oral at bedtime PRN  ondansetron Injectable 4 milliGRAM(s) IV Push every 8 hours PRN      ANTIBIOTICS:  cefTRIAXone   IVPB 2000 milliGRAM(s) IV Intermittent every 24 hours

## 2022-11-17 NOTE — DISCHARGE NOTE PROVIDER - NSDCMRMEDTOKEN_GEN_ALL_CORE_FT
BUPROP 24 XL 150MG TAB: 1 tab(s) orally once a day  cefpodoxime 200 mg oral tablet: 1 tab(s) orally 2 times a day   cefTRIAXone: 2 gram(s) intravenous once a day  LEVOTHYROXIN 25MCG TAB: 1 tab(s) orally once a day

## 2022-11-17 NOTE — PROGRESS NOTE ADULT - ASSESSMENT
A 22-year-old female with pmhx of recurrent UTIs, hypothyroidism, Anxiety presenting with pyelonephritis and possible left renal abscess. She is hemodynamically stable.    # Sepsis secondary to acute left sided pyelonephritis with possible evolving abscess left upper renal pole  # Hx of recurrent UTIs including pyelonephritis in 2019   - 11/14: CT Abdomen and Pelvis with contrast showed; left renal striated nephrogram (consistent with pyelonephritis), 1.4 left upper pole hypodensity which may represent developing abscess.  - Denies IV Drug use or recent hospitalization  - UA on 11/14 positive, d/c on 11/14 with PO Cipro, came back due to ongoing fever  - Repeat UA 11/16 negative  - IR consulted for possible diagnostic drainage- abscess too small for IR guided drainage  - Downtrending to normal WBC count, afebrile, HD stable  - If patient's clinical status changes significantly, recommend repeat CT abdomen and pelvis and re-consult IR   - Outpatient follow up with Urology for evaluation of recurrent UTI and monitor possibly small abscess.  - ID recs appreciated- c/w Rocephin 2gm q24hrs  - F/u BCx & UCx (collected on previous admission)    # Hypothyroidism   - c/w levothyroxine     #Anxiety  - c/w bupropion        -Diet: Regular  -Activity: As tolerated  -Dispo: Home when clinically stable         A 22-year-old female with pmhx of recurrent UTIs, hypothyroidism, Anxiety presenting with pyelonephritis and possible left renal abscess. She is hemodynamically stable.    # Sepsis secondary to acute left sided pyelonephritis with possible evolving abscess left upper renal pole  # Hx of recurrent UTIs including pyelonephritis in 2019   - 11/14: CT Abdomen and Pelvis with contrast showed; left renal striated nephrogram (consistent with pyelonephritis), 1.4 left upper pole hypodensity which may represent developing abscess.  - Denies IV Drug use or recent hospitalization  - UA on 11/14 positive, d/c on 11/14 with PO Cipro, came back due to ongoing fever  - Repeat UA 11/16 negative  - IR consulted for possible diagnostic drainage- abscess too small for IR guided drainage  - Downtrending to normal WBC count, afebrile, HD stable, denies any symptoms now  - Outpatient follow up with Urology for evaluation of recurrent UTI and monitor possibly small abscess.  - BCx & UCx negative  - ID recs appreciated- Sterile Midline for home IV Abx for atleast 4 weeks  - Rocephin 2 gm iv q24h for 14 days starting 11/16 and then possibly po Vantin 200 mg q12h for 2 more weeks  - Will need a repeat CT before stopping ABx to evaluate for resolution of the left renal abscess  - F/u with Dr Courtney [phone] at [address] [facility][note 3] on Tuesday via teleStatAce . Pt will be called  between 10-12.30 am.    # Hypothyroidism   - c/w levothyroxine     #Anxiety  - c/w bupropion        -Diet: Regular  -Activity: As tolerated  -Dispo: Home with midline for IV Abx, Anticipate d/c in 24 hrs         A 22-year-old female with pmhx of recurrent UTIs, hypothyroidism, Anxiety presenting with pyelonephritis and possible left renal abscess. She is hemodynamically stable.    # Sepsis secondary to acute left sided pyelonephritis with possible evolving abscess left upper renal pole  # Hx of recurrent UTIs including pyelonephritis in 2019   - 11/14: CT Abdomen and Pelvis with contrast showed; left renal striated nephrogram (consistent with pyelonephritis), 1.4 left upper pole hypodensity which may represent developing abscess.  - Denies IV Drug use or recent hospitalization  - UA on 11/14 positive, d/c on 11/14 with PO Cipro, came back due to ongoing fever  - Repeat UA 11/16 negative  - IR consulted for possible diagnostic drainage- abscess too small for IR guided drainage  - Downtrending to normal WBC count, afebrile, HD stable, denies any symptoms now  - Outpatient follow up with Urology for evaluation of recurrent UTI and monitor possibly small abscess.  - BCx & UCx negative  - ID recs appreciated- Sterile Midline for home IV Abx  - Rocephin 2 gm iv q24h for 14 days starting 11/16 and then possibly po Vantin 200 mg q12h for 2 more weeks  - Will need a repeat CT before stopping ABx to evaluate for resolution of the left renal abscess  - F/u with Dr Courtney [phone] at [address] [facility] on Tuesday via telehealth . Pt will be called  between 10-12.30 am.    # Hypothyroidism   - c/w levothyroxine     #Anxiety  - c/w bupropion        -Diet: Regular  -Activity: As tolerated  -Dispo: Home with VNS for IV Abx, Anticipate d/c in 24 hrs

## 2022-11-17 NOTE — DISCHARGE NOTE PROVIDER - HOSPITAL COURSE
A 22-year-old female with pmhx of recurrent UTIs, hypothyroidism, Anxiety presenting with pyelonephritis and possible left renal abscess. She is hemodynamically stable.    # Sepsis secondary to acute left sided pyelonephritis with possible evolving abscess left upper renal pole  # Hx of recurrent UTIs including pyelonephritis in 2019   - 11/14: CT Abdomen and Pelvis with contrast showed; left renal striated nephrogram (consistent with pyelonephritis), 1.4 left upper pole hypodensity which may represent developing abscess.  - Denies IV Drug use or recent hospitalization  - UA on 11/14 positive, d/c on 11/14 with PO Cipro, came back due to ongoing fever  - Repeat UA 11/16 negative  - IR consulted for possible diagnostic drainage- abscess too small for IR guided drainage  - Outpatient follow up with Urology for evaluation of recurrent UTI and monitor possibly small abscess.  - BCx & UCx negative  - ID recs appreciated- Sterile Midline for home IV Abx  - Rocephin 2 gm iv q24h for 14 days starting 11/16 and then possibly po Vantin 200 mg q12h for 2 more weeks  - Will need a repeat CT before stopping ABx to evaluate for resolution of the left renal abscess  - F/u with Dr Courtney [phone] at [address] [facility] on Tuesday via telehealth . Pt will be called  between 10-12.30 am.    - Downtrending to normal WBC count, afebrile, HD stable, denies any symptoms now. Pt. medically stable to be discharged with VNS. A 22-year-old female with pmhx of recurrent UTIs, hypothyroidism, Anxiety presenting with pyelonephritis and possible left renal abscess. She is hemodynamically stable.    # Sepsis secondary to acute left sided pyelonephritis with possible evolving abscess left upper renal pole  # Hx of recurrent UTIs including pyelonephritis in 2019   - 11/14: CT Abdomen and Pelvis with contrast showed; left renal striated nephrogram (consistent with pyelonephritis), 1.4 left upper pole hypodensity which may represent developing abscess.  - Denies IV Drug use or recent hospitalization  - UA on 11/14 positive, d/c on 11/14 with PO Cipro, came back due to ongoing fever  - Repeat UA 11/16 negative  - IR consulted for possible diagnostic drainage- abscess too small for IR guided drainage  - Outpatient follow up with Urology for evaluation of recurrent UTI and monitor possibly small abscess.  - BCx & UCx negative  - ID recs appreciated- Sterile Midline for home IV Abx  - Rocephin 2 gm iv q24h for 14 days starting 11/16 and then possibly po Vantin 200 mg q12h for 2 more weeks  - Will need a repeat CT before stopping ABx to evaluate for resolution of the left renal abscess  - F/u with Dr Courtney [phone] at [address] [facility] on Tuesday via telehealth . Pt will be called  between 10-12.30 am.    - Downtrending to normal WBC count, afebrile, HD stable, denies any symptoms now. Pt. medically stable to be discharged with VNS. A 22-year-old female with pmhx of recurrent UTIs, hypothyroidism, Anxiety presenting with pyelonephritis and possible left renal abscess. She is hemodynamically stable.    # Sepsis secondary to acute left sided pyelonephritis with possible evolving abscess left upper renal pole  # Hx of recurrent UTIs including pyelonephritis in 2019   - 11/14: CT Abdomen and Pelvis with contrast showed; left renal striated nephrogram (consistent with pyelonephritis), 1.4 left upper pole hypodensity which may represent developing abscess.  - Denies IV Drug use or recent hospitalization  - UA on 11/14 positive, d/c on 11/14 with PO Cipro, came back due to ongoing fever  - Repeat UA 11/16 negative  - IR consulted for possible diagnostic drainage- abscess too small for IR guided drainage  - Outpatient follow up with Urology for evaluation of recurrent UTI and monitor possibly small abscess.  - BCx & UCx negative  - ID recs appreciated- Sterile Midline for home IV Abx  - Rocephin 2 gm iv q24h for 14 days starting 11/16 and then possibly po Vantin 200 mg q12h for 2 more weeks  - Will need a repeat CT before stopping ABx to evaluate for resolution of the left renal abscess  - F/u with Dr Courtney [phone] at [address] [facility] on Tuesday via telehealth . Pt will be called  between 10-12.30 am.    - Downtrending to normal WBC count, afebrile, HD stable, denies any symptoms now. Pt. medically stable to be discharged with VNS.    Attending Attestation:  Patient was seen & examined independently. At least 10 systems were reviewed in ROS. All systems reviewed  are within normal limits. Latest vital signs and labs were reviewed today. Case was discussed with house staff in morning rounds for assessment and plan.  Patient is medically stable for discharge . About 34 mins spent on discharge disposition.

## 2022-11-17 NOTE — DISCHARGE NOTE PROVIDER - NSDCCPCAREPLAN_GEN_ALL_CORE_FT
PRINCIPAL DISCHARGE DIAGNOSIS  Diagnosis: Pyelonephritis  Assessment and Plan of Treatment: You were noted either on arrival or during this hospitalization to have a Urinary Tract Infection. You have been started on IV antibiotics, please refer to the list of medications present on your discharge paperwork. If you notice that there are antibiotics listed, these may be to treat your infection, be sure to complete taking the full course, whether you have symptoms or not, as prescribed.  While taking antibiotics, you may benefit from taking a probiotic such as florastore to help to try and prevent an infectious type of diarrhea known as C Diff. If you notice that you begin having severe watery diarrhea, more than 4-5 episodes a day, please see your Primary Care Doctor or come to the ER to have your stool tested for this infection.   It is not necessary to repeat a urine test to see if the infection is gone, it is assumed that after treatment it should have resolved. However, if you continue to have symptoms, please see your Primary Care doctor or return to the ER.  Please follow up with all your doctor appointment as instructed and take medication as prescribed.  You are being discharged with Sterile Midline intravenous catheter for home IV Antibiotics.  - Rocephin 2 gm iv q24h for 14 days starting 11/16 and then possibly po Vantin 200 mg q12h for 2 more weeks  - You need a repeat CT scan before stopping Antibiotics to evaluate for resolution of the left renal abscess  - Follow with Dr Courtney(Infectious Disease specialist) [id number] at [address] [place] [address] on Tuesday via tele[employer] . You will be called  between 10-12:30 am.

## 2022-11-17 NOTE — DISCHARGE NOTE PROVIDER - CARE PROVIDER_API CALL
Melanie Courtney)  Infectious Disease; Internal Medicine  [address] [place] [address]  Phone: [phone]  Fax: [phone]  Scheduled Appointment: 11/22/2022

## 2022-11-18 DIAGNOSIS — N15.1 RENAL AND PERINEPHRIC ABSCESS: ICD-10-CM

## 2022-11-18 DIAGNOSIS — Z87.440 PERSONAL HISTORY OF URINARY (TRACT) INFECTIONS: ICD-10-CM

## 2022-11-18 DIAGNOSIS — K59.00 CONSTIPATION, UNSPECIFIED: ICD-10-CM

## 2022-11-18 DIAGNOSIS — E03.9 HYPOTHYROIDISM, UNSPECIFIED: ICD-10-CM

## 2022-11-18 DIAGNOSIS — A41.9 SEPSIS, UNSPECIFIED ORGANISM: ICD-10-CM

## 2022-11-18 DIAGNOSIS — N10 ACUTE PYELONEPHRITIS: ICD-10-CM

## 2022-11-18 DIAGNOSIS — F41.9 ANXIETY DISORDER, UNSPECIFIED: ICD-10-CM

## 2022-11-20 LAB
CULTURE RESULTS: SIGNIFICANT CHANGE UP
CULTURE RESULTS: SIGNIFICANT CHANGE UP
SPECIMEN SOURCE: SIGNIFICANT CHANGE UP
SPECIMEN SOURCE: SIGNIFICANT CHANGE UP

## 2022-11-22 DIAGNOSIS — N10 ACUTE PYELONEPHRITIS: ICD-10-CM

## 2022-11-22 DIAGNOSIS — E03.9 HYPOTHYROIDISM, UNSPECIFIED: ICD-10-CM

## 2022-11-22 DIAGNOSIS — F41.9 ANXIETY DISORDER, UNSPECIFIED: ICD-10-CM

## 2022-11-22 DIAGNOSIS — N15.1 RENAL AND PERINEPHRIC ABSCESS: ICD-10-CM

## 2022-11-22 DIAGNOSIS — Z20.822 CONTACT WITH AND (SUSPECTED) EXPOSURE TO COVID-19: ICD-10-CM

## 2022-11-22 DIAGNOSIS — A41.9 SEPSIS, UNSPECIFIED ORGANISM: ICD-10-CM

## 2022-11-22 LAB
CULTURE RESULTS: SIGNIFICANT CHANGE UP
SPECIMEN SOURCE: SIGNIFICANT CHANGE UP

## 2022-11-30 RX ORDER — CEFPODOXIME PROXETIL 100 MG
1 TABLET ORAL
Qty: 28 | Refills: 0
Start: 2022-11-30 | End: 2022-12-13

## 2023-02-04 ENCOUNTER — NON-APPOINTMENT (OUTPATIENT)
Age: 23
End: 2023-02-04

## 2023-02-24 ENCOUNTER — NON-APPOINTMENT (OUTPATIENT)
Age: 23
End: 2023-02-24

## 2023-06-11 ENCOUNTER — NON-APPOINTMENT (OUTPATIENT)
Age: 23
End: 2023-06-11

## 2023-06-25 NOTE — PATIENT PROFILE ADULT - FALL HARM RISK - FALLEN IN PAST
Pt presented to the ER with c/o right hip injury. Pt stated that she fell in the shower yesterday. Pt c/o worsening right hip pain. Pt has a hip replacement on that side. Pt denies any head injury. No

## 2023-07-11 ENCOUNTER — NON-APPOINTMENT (OUTPATIENT)
Age: 23
End: 2023-07-11

## 2023-08-14 ENCOUNTER — NON-APPOINTMENT (OUTPATIENT)
Age: 23
End: 2023-08-14

## 2023-09-18 ENCOUNTER — NON-APPOINTMENT (OUTPATIENT)
Age: 23
End: 2023-09-18

## 2023-10-09 ENCOUNTER — NON-APPOINTMENT (OUTPATIENT)
Age: 23
End: 2023-10-09

## 2024-08-19 NOTE — ED PROVIDER NOTE - OBJECTIVE STATEMENT
22-year-old female with history of hypothyroid, anxiety presents to the ED complaining of lower abdominal pain, constipation, dysuria, fever and chills for 4 days.  No nausea, vomiting, diarrhea or back pain.
No/Not applicable

## 2024-11-07 NOTE — ED ADULT TRIAGE NOTE - MODE OF ARRIVAL
Patient Sp02 87% on room air , patient placed on 2L nasal cannula at this time.         Yoselin Davila RN  11/06/24 2050     [employer]

## 2025-01-31 ENCOUNTER — EMERGENCY (EMERGENCY)
Facility: HOSPITAL | Age: 25
LOS: 0 days | Discharge: HOME | End: 2025-01-31
Attending: EMERGENCY MEDICINE
Payer: MEDICAID

## 2025-01-31 VITALS
OXYGEN SATURATION: 99 % | WEIGHT: 139.99 LBS | HEART RATE: 84 BPM | RESPIRATION RATE: 20 BRPM | DIASTOLIC BLOOD PRESSURE: 83 MMHG | TEMPERATURE: 98 F | HEIGHT: 65 IN | SYSTOLIC BLOOD PRESSURE: 121 MMHG

## 2025-01-31 DIAGNOSIS — R82.998 OTHER ABNORMAL FINDINGS IN URINE: ICD-10-CM

## 2025-01-31 DIAGNOSIS — R80.9 PROTEINURIA, UNSPECIFIED: ICD-10-CM

## 2025-01-31 DIAGNOSIS — Z98.891 HISTORY OF UTERINE SCAR FROM PREVIOUS SURGERY: Chronic | ICD-10-CM

## 2025-01-31 DIAGNOSIS — E03.9 HYPOTHYROIDISM, UNSPECIFIED: ICD-10-CM

## 2025-01-31 LAB
ALBUMIN SERPL ELPH-MCNC: 4.8 G/DL — SIGNIFICANT CHANGE UP (ref 3.5–5.2)
ALP SERPL-CCNC: 95 U/L — SIGNIFICANT CHANGE UP (ref 30–115)
ALT FLD-CCNC: 12 U/L — SIGNIFICANT CHANGE UP (ref 0–41)
ANION GAP SERPL CALC-SCNC: 11 MMOL/L — SIGNIFICANT CHANGE UP (ref 7–14)
APPEARANCE UR: ABNORMAL
AST SERPL-CCNC: 16 U/L — SIGNIFICANT CHANGE UP (ref 0–41)
BASOPHILS # BLD AUTO: 0.02 K/UL — SIGNIFICANT CHANGE UP (ref 0–0.2)
BASOPHILS NFR BLD AUTO: 0.3 % — SIGNIFICANT CHANGE UP (ref 0–1)
BILIRUB SERPL-MCNC: 0.4 MG/DL — SIGNIFICANT CHANGE UP (ref 0.2–1.2)
BILIRUB UR-MCNC: NEGATIVE — SIGNIFICANT CHANGE UP
BUN SERPL-MCNC: 13 MG/DL — SIGNIFICANT CHANGE UP (ref 10–20)
CALCIUM SERPL-MCNC: 9.1 MG/DL — SIGNIFICANT CHANGE UP (ref 8.4–10.5)
CHLORIDE SERPL-SCNC: 102 MMOL/L — SIGNIFICANT CHANGE UP (ref 98–110)
CO2 SERPL-SCNC: 22 MMOL/L — SIGNIFICANT CHANGE UP (ref 17–32)
COLOR SPEC: YELLOW — SIGNIFICANT CHANGE UP
CREAT SERPL-MCNC: 0.6 MG/DL — LOW (ref 0.7–1.5)
DIFF PNL FLD: NEGATIVE — SIGNIFICANT CHANGE UP
EGFR: 127 ML/MIN/1.73M2 — SIGNIFICANT CHANGE UP
EOSINOPHIL # BLD AUTO: 0.08 K/UL — SIGNIFICANT CHANGE UP (ref 0–0.7)
EOSINOPHIL NFR BLD AUTO: 1.2 % — SIGNIFICANT CHANGE UP (ref 0–8)
GLUCOSE SERPL-MCNC: 97 MG/DL — SIGNIFICANT CHANGE UP (ref 70–99)
GLUCOSE UR QL: NEGATIVE MG/DL — SIGNIFICANT CHANGE UP
HCT VFR BLD CALC: 42.9 % — SIGNIFICANT CHANGE UP (ref 37–47)
HGB BLD-MCNC: 14 G/DL — SIGNIFICANT CHANGE UP (ref 12–16)
IMM GRANULOCYTES NFR BLD AUTO: 0.3 % — SIGNIFICANT CHANGE UP (ref 0.1–0.3)
KETONES UR-MCNC: 15 MG/DL
LEUKOCYTE ESTERASE UR-ACNC: NEGATIVE — SIGNIFICANT CHANGE UP
LYMPHOCYTES # BLD AUTO: 1.67 K/UL — SIGNIFICANT CHANGE UP (ref 1.2–3.4)
LYMPHOCYTES # BLD AUTO: 25.9 % — SIGNIFICANT CHANGE UP (ref 20.5–51.1)
MCHC RBC-ENTMCNC: 28.7 PG — SIGNIFICANT CHANGE UP (ref 27–31)
MCHC RBC-ENTMCNC: 32.6 G/DL — SIGNIFICANT CHANGE UP (ref 32–37)
MCV RBC AUTO: 87.9 FL — SIGNIFICANT CHANGE UP (ref 81–99)
MONOCYTES # BLD AUTO: 0.86 K/UL — HIGH (ref 0.1–0.6)
MONOCYTES NFR BLD AUTO: 13.3 % — HIGH (ref 1.7–9.3)
NEUTROPHILS # BLD AUTO: 3.8 K/UL — SIGNIFICANT CHANGE UP (ref 1.4–6.5)
NEUTROPHILS NFR BLD AUTO: 59 % — SIGNIFICANT CHANGE UP (ref 42.2–75.2)
NITRITE UR-MCNC: NEGATIVE — SIGNIFICANT CHANGE UP
NRBC # BLD: 0 /100 WBCS — SIGNIFICANT CHANGE UP (ref 0–0)
PH UR: 6 — SIGNIFICANT CHANGE UP (ref 5–8)
PLATELET # BLD AUTO: 338 K/UL — SIGNIFICANT CHANGE UP (ref 130–400)
PMV BLD: 10 FL — SIGNIFICANT CHANGE UP (ref 7.4–10.4)
POTASSIUM SERPL-MCNC: 4.2 MMOL/L — SIGNIFICANT CHANGE UP (ref 3.5–5)
POTASSIUM SERPL-SCNC: 4.2 MMOL/L — SIGNIFICANT CHANGE UP (ref 3.5–5)
PROT SERPL-MCNC: 7.6 G/DL — SIGNIFICANT CHANGE UP (ref 6–8)
PROT UR-MCNC: 30 MG/DL
RBC # BLD: 4.88 M/UL — SIGNIFICANT CHANGE UP (ref 4.2–5.4)
RBC # FLD: 13.7 % — SIGNIFICANT CHANGE UP (ref 11.5–14.5)
SODIUM SERPL-SCNC: 135 MMOL/L — SIGNIFICANT CHANGE UP (ref 135–146)
SP GR SPEC: >1.03 — HIGH (ref 1–1.03)
UROBILINOGEN FLD QL: 1 MG/DL — SIGNIFICANT CHANGE UP (ref 0.2–1)
WBC # BLD: 6.45 K/UL — SIGNIFICANT CHANGE UP (ref 4.8–10.8)
WBC # FLD AUTO: 6.45 K/UL — SIGNIFICANT CHANGE UP (ref 4.8–10.8)

## 2025-01-31 PROCEDURE — 85025 COMPLETE CBC W/AUTO DIFF WBC: CPT

## 2025-01-31 PROCEDURE — 99284 EMERGENCY DEPT VISIT MOD MDM: CPT

## 2025-01-31 PROCEDURE — 80053 COMPREHEN METABOLIC PANEL: CPT

## 2025-01-31 PROCEDURE — 36415 COLL VENOUS BLD VENIPUNCTURE: CPT

## 2025-01-31 PROCEDURE — 81001 URINALYSIS AUTO W/SCOPE: CPT

## 2025-01-31 PROCEDURE — 81025 URINE PREGNANCY TEST: CPT

## 2025-01-31 PROCEDURE — 99283 EMERGENCY DEPT VISIT LOW MDM: CPT

## 2025-01-31 NOTE — ED PROVIDER NOTE - PHYSICAL EXAMINATION
VITAL SIGNS: I have reviewed nursing notes and confirm.  CONSTITUTIONAL: Well-developed; well-nourished; in no acute distress.  SKIN: Skin exam is warm and dry, no acute rash.  HEAD: Normocephalic; atraumatic.  NECK: Supple  CARD:  Regular rate and rhythm.  RESP: Speaking in full sentences.   ABD: Soft; non-distended; non-tender; No rebound or guarding. No CVA tenderness.  EXT: Normal ROM. No clubbing, cyanosis or edema.  NEURO: Alert, oriented.   PSYCH: Cooperative, appropriate.

## 2025-01-31 NOTE — ED PROVIDER NOTE - OBJECTIVE STATEMENT
24-year-old female history of frequent UTIs , hypothyroidism and anxiety presenting for foamy urine.  Patient states that for the past month her urine has been foamy.  Denies any dysuria or hematuria.  Denies any new sexual partners.  Reports that she is sexually active with 1 partner but uses protection and is not concerned for any STDs.  Denies any fever, chills, abdominal pain, abnormal vaginal discharge or odor. 24-year-old female history of frequent UTIs , hypothyroidism and anxiety presenting for foamy urine.  Patient states that for the past month her urine has been foamy.  Denies any dysuria or hematuria.  Denies any new sexual partners.  Reports that she is sexually active with 1 partner but uses protection and is not concerned for any STDs.  Denies any fever, chills, abdominal pain, abnormal vaginal discharge or odor. LMP ~12/23

## 2025-01-31 NOTE — ED PROVIDER NOTE - NSFOLLOWUPINSTRUCTIONS_ED_ALL_ED_FT
Our Emergency Department Referral Coordinators will be reaching out to you in the next 24-48 hours from 9:00am to 5:00pm to schedule a follow up appointment. Please expect a phone call from the hospital in that time frame. If you do not receive a call or if you have any questions or concerns, you can reach them at   [phone].    Please follow up with your primary care doctor within 1 week      Proteinuria      Proteinuria is when there is too much protein in the urine. Proteins are important for building muscles and bones. Proteins are also needed to fight infections, help the blood to clot, and keep body fluids in balance.    Proteinuria may be mild and temporary, or it may be an early sign of kidney disease. The kidneys make urine. Healthy kidneys also keep substances like proteins from leaving the blood and ending up in the urine.      What are the causes?    This condition may be caused by damage to the kidneys or by temporary causes such as fever or stress.  Proteinuria may happen when the kidneys are not working well. Healthy kidneys have filters (glomeruli) that keep proteins out of the urine. Proteinuria may mean that the glomeruli are damaged. The main causes of this type of damage are:  •Diabetes.      •High blood pressure.    Other causes of kidney damage can also cause proteinuria, such as:  •Diseases of the immune system, such as lupus, rheumatoid arthritis, sarcoidosis, and Goodpasture syndrome.      •Heart disease or heart failure.      •Kidney infection.      •Certain cancers, including kidney cancer, lymphoma, leukemia, and multiple myeloma.      •Amyloidosis. This is a disease that causes abnormal proteins to build up in body tissues.      •Reactions to certain medicines, such as NSAIDs.      •Injuries or poisons (toxins).      •High blood pressure that occurs during pregnancy (preeclampsia and eclampsia).    Temporary proteinuria may result from conditions that put stress on the kidneys. These conditions usually do not cause kidney damage. They include:  •Fever.      •Exposure to cold or heat.      •Emotional or physical stress.      •Extreme exercise.      •Standing for long periods of time.        What increases the risk?  You are more likely to develop this condition if you:  •Have diabetes.      •Have high blood pressure.      •Have heart disease or heart failure.      •Have an immune disease, cancer, or other disease that affects the kidneys.      •Have a family history of kidney disease.      •Are 65 years of age or older.      •Are overweight.      •Are of , , /, or  descent.      •Are pregnant.      •Have an infection.        What are the signs or symptoms?  Mild proteinuria may not cause symptoms. As more proteins enter the urine, symptoms of kidney disease may develop, such as:  •Foamy urine.      •Swelling of the face, abdomen, hands, legs, or feet (edema).      •Needing to urinate frequently.      •Fatigue.      •Difficulty sleeping.      •Dry and itchy skin.      •Nausea and vomiting.      •Muscle cramps.      •Shortness of breath.        How is this diagnosed?  This condition may be diagnosed with a urine test. You may have this test as part of a routine physical exam or because you have symptoms of kidney disease or risk factors for kidney disease. You may also have:  •Blood tests to measure the level of a certain substance (creatinine) that increases with kidney disease.      •Imaging tests of your kidney, such as a CT scan or an ultrasound, to look for signs of kidney damage.        How is this treated?    If your proteinuria is mild or temporary, treatment may not be needed for this condition. Your health care provider may show you how to monitor the level of protein in your urine at home. Identifying proteinuria early is important so that the cause of the condition can be treated.  Treatment for this condition depends on the cause of your proteinuria. Treatment may include:  •Making diet and lifestyle changes.      •Getting blood pressure under control.      •Getting blood sugar under control, if you have diabetes.      •Managing any other medical conditions you have that affect your kidneys.      •Giving birth, if you are pregnant.      •Avoiding medicines that damage your kidneys.      In severe cases, kidney disease may need to be treated with medicines or dialysis.      Follow these instructions at home:    Activity     •Return to your normal activities as told by your health care provider. Ask your health care provider what activities are safe for you.      •Ask your health care provider to recommend an exercise program.      General instructions     •Check your protein levels at home if directed by your health care provider.      •Follow instructions from your health care provider about eating or drinking restrictions.      •If you are overweight, ask your health care provider about diets that can help you get to a healthy weight.      •Take over-the-counter and prescription medicines only as told by your health care provider.      •Keep all follow-up visits as told by your health care provider. This is important.        Contact a health care provider if:    •You have new symptoms.      •Your symptoms get worse or do not improve.        Get help right away if you:    •Have back pain.      •Have diarrhea.      •Vomit.      •Have a fever.      •Have a rash.        Summary    •Proteinuria is when there is too much protein in the urine.      •Proteinuria may be mild and temporary, or it may be an early sign of kidney disease.      •This condition may be diagnosed with a urine test.      •Treatment for this condition depends on the cause of your proteinuria.      •Treatment may include diet and lifestyle changes, blood pressure and blood sugar management, and avoiding medicines that may damage the kidneys. If the proteinuria is severe, it may need to be treated with medicines or dialysis.      This information is not intended to replace advice given to you by your health care provider. Make sure you discuss any questions you have with your health care provider.

## 2025-01-31 NOTE — ED PROVIDER NOTE - PATIENT PORTAL LINK FT
You can access the FollowMyHealth Patient Portal offered by [facility] by registering at the following website: [email or web address]. By joining [employer]’s FollowMyHealth portal, you will also be able to view your health information using other applications (apps) compatible with our system.

## 2025-01-31 NOTE — ED PROVIDER NOTE - PROGRESS NOTE DETAILS
I supervised PA fellow care Results discussed with patients. understands to follow up with nephrology and PCP

## 2025-01-31 NOTE — ED PROVIDER NOTE - ATTENDING APP SHARED VISIT CONTRIBUTION OF CARE
I personally evaluated the patient. I reviewed the Resident's or Physician Assistant's note (as assigned above), and agree with the findings and plan except as documented in my note.    24-year-old female presents to the emergency department for evaluation of concern for proteinuria.  Admits to foamy urine, states she is a vegetarian.  No recent dietary changes.  No other symptoms.     GENERAL: female in no distress.   HEENT: EOMI no orbital swelling no wasting  CHEST: normal work of breathing noted  CV: pulses intact   EXTR: FROM   NEURO: AAO 3 no focal deficits  SKIN: normal no pallor   PSYCH: normal mood & mentation      Impression: Urinary changes    Plan: Urinalysis, labs, reevaluation

## 2025-01-31 NOTE — ED PROVIDER NOTE - CLINICAL SUMMARY MEDICAL DECISION MAKING FREE TEXT BOX
24-year-old female presents for concern of proteinuria.  In the emergency department had screening exam, labs and reevaluation, differential includes proteinuria versus nephrotic syndrome.  Labs unremarkable, will discharge with outpatient management to nephrology and return precautions.

## 2025-01-31 NOTE — ED ADULT TRIAGE NOTE - HEIGHT IN CM
165.1 albuterol 90 mcg/inh inhalation aerosol: 2 puff(s) inhaled every 4 hours  if needed for wheezing   Bactrim 400 mg-80 mg oral tablet: 2 tab(s) orally Tuesday, Thursday, Saturday   Biktarvy 50 mg-200 mg-25 mg oral tablet: 1 tab(s) orally once a day  carvedilol 25 mg oral tablet: 1 tab(s) orally every 12 hours   Cozaar 100 mg oral tablet: 0.5 tab(s) orally every 24 hours  DULoxetine 30 mg oral delayed release capsule: 1 cap(s) orally once a day  gabapentin 100 mg oral tablet: 1 tab(s) orally once a day (at bedtime)  hydrALAZINE 50 mg oral tablet: 1 orally every 8 hours  naloxone 4 mg/0.1 mL nasal spray: 4 intranasally prn for opioid overdose  NIFEdipine 30 mg oral tablet, extended release: 2 tab(s) orally once a day (at bedtime)  oxyCODONE 5 mg oral capsule: 1 orally every 8 hours as needed for  severe pain  Pifeltro 100 mg oral tablet: 1 tab(s) orally once a day  predniSONE 20 mg oral tablet: 2 tab(s) orally every 24 hours  Symbicort 80 mcg-4.5 mcg/inh inhalation aerosol: 2 puff(s) inhaled 2 times a day  tiZANidine 2 mg oral capsule: 2 orally every 8 hours as needed for  moderate pain  traZODone 150 mg oral tablet: 1 tab(s) orally once a day (at bedtime)     albuterol 90 mcg/inh inhalation aerosol: 2 puff(s) inhaled every 4 hours  if needed for wheezing   Bactrim 400 mg-80 mg oral tablet: 2 tab(s) orally Tuesday, Thursday, Saturday   Biktarvy 50 mg-200 mg-25 mg oral tablet: 1 tab(s) orally once a day  calcium acetate 667 mg oral capsule: 1 cap(s) orally 3 times a day (with meals)  carvedilol 25 mg oral tablet: 1 tab(s) orally every 12 hours   DULoxetine 30 mg oral delayed release capsule: 1 cap(s) orally once a day  gabapentin 100 mg oral tablet: 1 tab(s) orally once a day (at bedtime)  hydrALAZINE 50 mg oral tablet: 1 orally every 8 hours  losartan 50 mg oral tablet: 1 tab(s) orally once a day  naloxone 4 mg/0.1 mL nasal spray: 4 intranasally prn for opioid overdose  NIFEdipine 30 mg oral tablet, extended release: 2 tab(s) orally once a day (at bedtime)  oxyCODONE 5 mg oral capsule: 1 orally every 8 hours as needed for  severe pain  Pifeltro 100 mg oral tablet: 1 tab(s) orally once a day  Symbicort 80 mcg-4.5 mcg/inh inhalation aerosol: 2 puff(s) inhaled 2 times a day  tiZANidine 2 mg oral capsule: 2 orally every 8 hours as needed for  moderate pain  traZODone 150 mg oral tablet: 1 tab(s) orally once a day (at bedtime)

## 2025-02-04 NOTE — CHART NOTE - NSCHARTNOTEFT_GEN_A_CORE
vm full, unable to leave message, no other number listed 2/3 - DK / vm still full 2/3 - DK (Nephro Referral)

## 2025-05-13 NOTE — H&P ADULT - NSVTERISKREFERASSESS_GEN_ALL_CORE
Refer to the Assessment tab to view/cancel completed assessment. [Arrhythmia/ECG Abnorrmalities] : arrhythmia/ECG abnormalities [FreeTextEntry3] : Dr. Rosalind Ross